# Patient Record
Sex: MALE | NOT HISPANIC OR LATINO
[De-identification: names, ages, dates, MRNs, and addresses within clinical notes are randomized per-mention and may not be internally consistent; named-entity substitution may affect disease eponyms.]

---

## 2017-07-23 RX ORDER — VALACYCLOVIR 500 MG/1
1 TABLET, FILM COATED ORAL
Qty: 0 | Refills: 0 | COMMUNITY
Start: 2017-07-23 | End: 2017-07-30

## 2017-07-24 ENCOUNTER — TRANSCRIPTION ENCOUNTER (OUTPATIENT)
Age: 42
End: 2017-07-24

## 2017-07-24 ENCOUNTER — INPATIENT (INPATIENT)
Facility: HOSPITAL | Age: 42
LOS: 1 days | Discharge: HOME | DRG: 103 | End: 2017-07-26
Attending: INTERNAL MEDICINE | Admitting: INTERNAL MEDICINE
Payer: COMMERCIAL

## 2017-07-24 VITALS
OXYGEN SATURATION: 100 % | WEIGHT: 184.97 LBS | TEMPERATURE: 98 F | RESPIRATION RATE: 18 BRPM | HEART RATE: 71 BPM | HEIGHT: 69 IN | DIASTOLIC BLOOD PRESSURE: 59 MMHG | SYSTOLIC BLOOD PRESSURE: 128 MMHG

## 2017-07-24 DIAGNOSIS — G43.909 MIGRAINE, UNSPECIFIED, NOT INTRACTABLE, WITHOUT STATUS MIGRAINOSUS: ICD-10-CM

## 2017-07-24 DIAGNOSIS — B02.9 ZOSTER WITHOUT COMPLICATIONS: ICD-10-CM

## 2017-07-24 DIAGNOSIS — R51 HEADACHE: ICD-10-CM

## 2017-07-24 DIAGNOSIS — G43.011 MIGRAINE WITHOUT AURA, INTRACTABLE, WITH STATUS MIGRAINOSUS: ICD-10-CM

## 2017-07-24 LAB
ALBUMIN SERPL ELPH-MCNC: 4.7 G/DL — SIGNIFICANT CHANGE UP (ref 3.3–5)
ALP SERPL-CCNC: 49 U/L — SIGNIFICANT CHANGE UP (ref 40–120)
ALT FLD-CCNC: 27 U/L RC — SIGNIFICANT CHANGE UP (ref 10–45)
ANION GAP SERPL CALC-SCNC: 16 MMOL/L — SIGNIFICANT CHANGE UP (ref 5–17)
AST SERPL-CCNC: 22 U/L — SIGNIFICANT CHANGE UP (ref 10–40)
BASOPHILS # BLD AUTO: 0 K/UL — SIGNIFICANT CHANGE UP (ref 0–0.2)
BASOPHILS NFR BLD AUTO: 0 % — SIGNIFICANT CHANGE UP (ref 0–2)
BILIRUB SERPL-MCNC: 0.3 MG/DL — SIGNIFICANT CHANGE UP (ref 0.2–1.2)
BUN SERPL-MCNC: 16 MG/DL — SIGNIFICANT CHANGE UP (ref 7–23)
CALCIUM SERPL-MCNC: 9.7 MG/DL — SIGNIFICANT CHANGE UP (ref 8.4–10.5)
CHLORIDE SERPL-SCNC: 100 MMOL/L — SIGNIFICANT CHANGE UP (ref 96–108)
CO2 SERPL-SCNC: 23 MMOL/L — SIGNIFICANT CHANGE UP (ref 22–31)
CREAT SERPL-MCNC: 0.91 MG/DL — SIGNIFICANT CHANGE UP (ref 0.5–1.3)
EOSINOPHIL # BLD AUTO: 0 K/UL — SIGNIFICANT CHANGE UP (ref 0–0.5)
EOSINOPHIL NFR BLD AUTO: 0.3 % — SIGNIFICANT CHANGE UP (ref 0–6)
GAS PNL BLDV: SIGNIFICANT CHANGE UP
GLUCOSE SERPL-MCNC: 111 MG/DL — HIGH (ref 70–99)
HCT VFR BLD CALC: 46 % — SIGNIFICANT CHANGE UP (ref 39–50)
HGB BLD-MCNC: 15.2 G/DL — SIGNIFICANT CHANGE UP (ref 13–17)
LYMPHOCYTES # BLD AUTO: 0.7 K/UL — LOW (ref 1–3.3)
LYMPHOCYTES # BLD AUTO: 10.1 % — LOW (ref 13–44)
MCHC RBC-ENTMCNC: 28.3 PG — SIGNIFICANT CHANGE UP (ref 27–34)
MCHC RBC-ENTMCNC: 33 GM/DL — SIGNIFICANT CHANGE UP (ref 32–36)
MCV RBC AUTO: 85.6 FL — SIGNIFICANT CHANGE UP (ref 80–100)
MONOCYTES # BLD AUTO: 0.4 K/UL — SIGNIFICANT CHANGE UP (ref 0–0.9)
MONOCYTES NFR BLD AUTO: 6.6 % — SIGNIFICANT CHANGE UP (ref 2–14)
NEUTROPHILS # BLD AUTO: 5.6 K/UL — SIGNIFICANT CHANGE UP (ref 1.8–7.4)
NEUTROPHILS NFR BLD AUTO: 83 % — HIGH (ref 43–77)
PLATELET # BLD AUTO: 175 K/UL — SIGNIFICANT CHANGE UP (ref 150–400)
POTASSIUM SERPL-MCNC: 4.2 MMOL/L — SIGNIFICANT CHANGE UP (ref 3.5–5.3)
POTASSIUM SERPL-SCNC: 4.2 MMOL/L — SIGNIFICANT CHANGE UP (ref 3.5–5.3)
PROT SERPL-MCNC: 8 G/DL — SIGNIFICANT CHANGE UP (ref 6–8.3)
RBC # BLD: 5.37 M/UL — SIGNIFICANT CHANGE UP (ref 4.2–5.8)
RBC # FLD: 12.9 % — SIGNIFICANT CHANGE UP (ref 10.3–14.5)
SODIUM SERPL-SCNC: 139 MMOL/L — SIGNIFICANT CHANGE UP (ref 135–145)
WBC # BLD: 6.7 K/UL — SIGNIFICANT CHANGE UP (ref 3.8–10.5)
WBC # FLD AUTO: 6.7 K/UL — SIGNIFICANT CHANGE UP (ref 3.8–10.5)

## 2017-07-24 PROCEDURE — 99285 EMERGENCY DEPT VISIT HI MDM: CPT

## 2017-07-24 PROCEDURE — 99223 1ST HOSP IP/OBS HIGH 75: CPT

## 2017-07-24 RX ORDER — SODIUM CHLORIDE 9 MG/ML
1000 INJECTION INTRAMUSCULAR; INTRAVENOUS; SUBCUTANEOUS ONCE
Qty: 0 | Refills: 0 | Status: COMPLETED | OUTPATIENT
Start: 2017-07-24 | End: 2017-07-24

## 2017-07-24 RX ORDER — ACETAMINOPHEN 500 MG
1000 TABLET ORAL ONCE
Qty: 0 | Refills: 0 | Status: COMPLETED | OUTPATIENT
Start: 2017-07-24 | End: 2017-07-24

## 2017-07-24 RX ORDER — METOCLOPRAMIDE HCL 10 MG
10 TABLET ORAL EVERY 8 HOURS
Qty: 0 | Refills: 0 | Status: DISCONTINUED | OUTPATIENT
Start: 2017-07-24 | End: 2017-07-25

## 2017-07-24 RX ORDER — SODIUM CHLORIDE 9 MG/ML
1000 INJECTION INTRAMUSCULAR; INTRAVENOUS; SUBCUTANEOUS
Qty: 0 | Refills: 0 | Status: DISCONTINUED | OUTPATIENT
Start: 2017-07-24 | End: 2017-07-26

## 2017-07-24 RX ORDER — ACETAMINOPHEN 500 MG
650 TABLET ORAL EVERY 6 HOURS
Qty: 0 | Refills: 0 | Status: DISCONTINUED | OUTPATIENT
Start: 2017-07-24 | End: 2017-07-26

## 2017-07-24 RX ORDER — PANTOPRAZOLE SODIUM 20 MG/1
40 TABLET, DELAYED RELEASE ORAL
Qty: 0 | Refills: 0 | Status: DISCONTINUED | OUTPATIENT
Start: 2017-07-24 | End: 2017-07-26

## 2017-07-24 RX ORDER — MORPHINE SULFATE 50 MG/1
2 CAPSULE, EXTENDED RELEASE ORAL EVERY 8 HOURS
Qty: 0 | Refills: 0 | Status: DISCONTINUED | OUTPATIENT
Start: 2017-07-24 | End: 2017-07-25

## 2017-07-24 RX ORDER — VALACYCLOVIR 500 MG/1
1000 TABLET, FILM COATED ORAL EVERY 8 HOURS
Qty: 0 | Refills: 0 | Status: DISCONTINUED | OUTPATIENT
Start: 2017-07-24 | End: 2017-07-25

## 2017-07-24 RX ORDER — ACYCLOVIR SODIUM 500 MG
0 VIAL (EA) INTRAVENOUS
Qty: 0 | Refills: 0 | COMMUNITY

## 2017-07-24 RX ORDER — MORPHINE SULFATE 50 MG/1
4 CAPSULE, EXTENDED RELEASE ORAL ONCE
Qty: 0 | Refills: 0 | Status: DISCONTINUED | OUTPATIENT
Start: 2017-07-24 | End: 2017-07-24

## 2017-07-24 RX ORDER — KETOROLAC TROMETHAMINE 30 MG/ML
30 SYRINGE (ML) INJECTION EVERY 8 HOURS
Qty: 0 | Refills: 0 | Status: DISCONTINUED | OUTPATIENT
Start: 2017-07-24 | End: 2017-07-26

## 2017-07-24 RX ORDER — METOCLOPRAMIDE HCL 10 MG
10 TABLET ORAL ONCE
Qty: 0 | Refills: 0 | Status: COMPLETED | OUTPATIENT
Start: 2017-07-24 | End: 2017-07-24

## 2017-07-24 RX ORDER — VALPROIC ACID (AS SODIUM SALT) 250 MG/5ML
500 SOLUTION, ORAL ORAL ONCE
Qty: 0 | Refills: 0 | Status: COMPLETED | OUTPATIENT
Start: 2017-07-24 | End: 2017-07-24

## 2017-07-24 RX ORDER — KETOROLAC TROMETHAMINE 30 MG/ML
10 SYRINGE (ML) INJECTION
Qty: 0 | Refills: 0 | Status: DISCONTINUED | OUTPATIENT
Start: 2017-07-24 | End: 2017-07-24

## 2017-07-24 RX ADMIN — MORPHINE SULFATE 4 MILLIGRAM(S): 50 CAPSULE, EXTENDED RELEASE ORAL at 15:59

## 2017-07-24 RX ADMIN — MORPHINE SULFATE 4 MILLIGRAM(S): 50 CAPSULE, EXTENDED RELEASE ORAL at 14:23

## 2017-07-24 RX ADMIN — Medication 10 MILLIGRAM(S): at 21:21

## 2017-07-24 RX ADMIN — SODIUM CHLORIDE 1000 MILLILITER(S): 9 INJECTION INTRAMUSCULAR; INTRAVENOUS; SUBCUTANEOUS at 14:07

## 2017-07-24 RX ADMIN — Medication 10 MILLIGRAM(S): at 20:21

## 2017-07-24 RX ADMIN — Medication 1000 MILLIGRAM(S): at 14:23

## 2017-07-24 RX ADMIN — Medication 220 MILLIGRAM(S): at 16:30

## 2017-07-24 RX ADMIN — VALACYCLOVIR 1000 MILLIGRAM(S): 500 TABLET, FILM COATED ORAL at 18:30

## 2017-07-24 RX ADMIN — Medication 10 MILLIGRAM(S): at 14:12

## 2017-07-24 RX ADMIN — Medication 650 MILLIGRAM(S): at 20:21

## 2017-07-24 RX ADMIN — SODIUM CHLORIDE 70 MILLILITER(S): 9 INJECTION INTRAMUSCULAR; INTRAVENOUS; SUBCUTANEOUS at 19:59

## 2017-07-24 RX ADMIN — MORPHINE SULFATE 2 MILLIGRAM(S): 50 CAPSULE, EXTENDED RELEASE ORAL at 23:00

## 2017-07-24 RX ADMIN — MORPHINE SULFATE 2 MILLIGRAM(S): 50 CAPSULE, EXTENDED RELEASE ORAL at 22:40

## 2017-07-24 RX ADMIN — Medication 400 MILLIGRAM(S): at 14:07

## 2017-07-24 RX ADMIN — Medication 10 MILLIGRAM(S): at 22:39

## 2017-07-24 NOTE — H&P ADULT - NEGATIVE GASTROINTESTINAL SYMPTOMS
no change in bowel habits/no vomiting/no flatulence/no diarrhea/no nausea/no constipation no flatulence/no vomiting/no constipation/no diarrhea/no change in bowel habits

## 2017-07-24 NOTE — H&P ADULT - PROBLEM SELECTOR PLAN 1
Patient with h/o Migraine usually relieved by Sumatriptan   Will start Toradol/ SUmatriptan   Patient might benefit from a migraine prevention agent upon D/C

## 2017-07-24 NOTE — CONSULT NOTE ADULT - ASSESSMENT
43 y/o M w/ a PMHx of migraines who presents to the ED with headache. Pt recently diagnosed to have Shingles on Sunday at urgent and has been taking Valtrex. Exam is consistent with Lower back vesicular rash of herpes zoster in R lower back. No hx of immuno deficiency. Zooster rash with severe headache raises the possibility of aseptic meningitis. 41 y/o M w/ a PMHx of migraines who presents to the ED with headache. Pt recently diagnosed to have Shingles on Sunday at urgent and has been taking Valtrex. Exam is consistent with Lower back vesicular rash of herpes zoster in R lower back. No hx of immuno deficiency. Zooster rash with severe headache raises the possibility of aseptic meningitis. 41 y/o M w/ a PMHx of migraines who presents to the ED with headache. Pt recently diagnosed to have Shingles on Sunday at urgent and has been taking Valtrex. Exam is consistent with  vesicular rash of herpes zoster in R lower back. No hx of immuno deficiency. Zooster rash with severe headache raises the possibility of aseptic meningitis. 43 y/o M w/ a PMHx of migraines who presents to the ED with headache. Pt recently diagnosed to have Shingles on Sunday at urgent and has been taking Valtrex. Exam is consistent with vesicular rash of herpes zoster in R lower back. No hx of immuno deficiency. Zooster rash with severe headache raises the possibility of aseptic meningitis. 41 y/o M w/ a PMHx of migraines who presents to the ED with headache. Pt recently diagnosed to have Shingles on Sunday at urgent and has been taking Valtrex. Exam is consistent with vesicular rash of herpes zoster in R lower back. No hx of immuno deficiency.

## 2017-07-24 NOTE — ED PROVIDER NOTE - ATTENDING CONTRIBUTION TO CARE
****ATTENDING**** 41yo male hx Migraine presents with HA x 1 day. Patient diagnosed with R flank shingles 2 days ago on valtrex. States HA is similar to prior Migraine. Denies any fever. + chills. No recent travel. + photophobia similar to prior migraine. On exam, Patient is awake,alert,oriented x 3. PERRL, EOMI. Patient's chest is clear to ausculation, +s1s2. Abdomen is soft nd/nt +BS. Back L4 R flank dermatome rash with vesicular lesions, no overlying cellulitis. Extremity with no swelling or calf tenderness. Neuro intact.  Check Labs, DDx Migraine, low susp for meningitis. Neuro and ID consult.  Patient states he took triptan before arrival wo any improvement.

## 2017-07-24 NOTE — H&P ADULT - NEGATIVE CARDIOVASCULAR SYMPTOMS
no orthopnea/no dyspnea on exertion/no chest pain/no peripheral edema/no paroxysmal nocturnal dyspnea/no palpitations

## 2017-07-24 NOTE — CONSULT NOTE ADULT - SUBJECTIVE AND OBJECTIVE BOX
Patient is a 42y old  Male who presents with a chief complaint of     HPI:  Patient is a 41 y/o M w/ a PMHx of migraines who presents to the ED with headache. Patient reports that he began having pain and a rash on his right lower back 2 days ago and was subsequently found to have Shingles at Urgent Care the following day. Patient has been taking Valtrex for shingles since yesterday. This AM, pt reports that he develops a severe HA this morning associated with persistent nausea. HA is described as circumferential and "tightening." It is associated with mild photophobia. No aura mentioned. Patient reports that it feels very similar to prior migraine episodes. He states that his last episode of a migraine was about3-4 years ago. He states that he took 1 PO Sumatriptan, 1 injectable Sumatriptan, and PO Reglan this AM which helped the HA somewhat, but not the nausea. He endorses some chills, but denies any fever, chest pain, shortness of breath, or abdominal pain.    PAST MEDICAL & SURGICAL HISTORY:  Migraine  Shingles      Social history:  Non smoker  No alcohol  No drug abuse  Sexually active with wife  No other sexual partners  Works as   Had Chicken pox as child      FAMILY HISTORY:  No pertinent     Allergies    No Known Allergies    Intolerances        Antimicrobials:  None        Vital Signs Last 24 Hrs  T(C): 36.5 (24 Jul 2017 12:28), Max: 36.5 (24 Jul 2017 12:28)  T(F): 97.7 (24 Jul 2017 12:28), Max: 97.7 (24 Jul 2017 12:28)  HR: 74 (24 Jul 2017 12:31) (71 - 74)  BP: 146/87 (24 Jul 2017 12:31) (128/59 - 146/87)  BP(mean): --  RR: 16 (24 Jul 2017 12:31) (16 - 18)  SpO2: 100% (24 Jul 2017 12:31) (100% - 100%)                              15.2   6.7   )-----------( 175      ( 24 Jul 2017 14:18 )             46.0         07-24    139  |  100  |  16  ----------------------------<  111<H>  4.2   |  23  |  0.91    Ca    9.7      24 Jul 2017 14:18    TPro  8.0  /  Alb  4.7  /  TBili  0.3  /  DBili  x   /  AST  22  /  ALT  27  /  AlkPhos  49  07-24      RECENT CULTURES: Patient is a 42y old  Male who presents with a chief complaint of     HPI:  Patient is a 43 y/o M w/ a PMHx of migraines who presents to the ED with headache. Patient reports that he began having pain and a rash on his right lower back 2 days ago and was subsequently found to have Shingles at Urgent Care the following day. Patient has been taking Valtrex for shingles since yesterday. This AM, pt reports that he develops a severe HA this morning associated with persistent nausea. HA is described as circumferential and "tightening." It is associated with mild photophobia. No aura mentioned. Patient reports that it feels very similar to prior migraine episodes. He states that his last episode of a migraine was about3-4 years ago. He states that he took 1 PO Sumatriptan, 1 injectable Sumatriptan, and PO Reglan this AM which helped the HA somewhat, but not the nausea. He endorses some chills, but denies any fever, chest pain, shortness of breath, or abdominal pain.    PAST MEDICAL & SURGICAL HISTORY:  Migraine  Shingles      SOCIAL HISTORY  Non smoker  No alcohol  No drug abuse  Sexually active with wife  No other sexual partners  Works as   Had chicken pox as child      FAMILY HISTORY:  No pertinent     Allergies    No Known Allergies    Intolerances        Antimicrobials:  None        Vital Signs Last 24 Hrs  T(C): 36.5 (24 Jul 2017 12:28), Max: 36.5 (24 Jul 2017 12:28)  T(F): 97.7 (24 Jul 2017 12:28), Max: 97.7 (24 Jul 2017 12:28)  HR: 74 (24 Jul 2017 12:31) (71 - 74)  BP: 146/87 (24 Jul 2017 12:31) (128/59 - 146/87)  BP(mean): --  RR: 16 (24 Jul 2017 12:31) (16 - 18)  SpO2: 100% (24 Jul 2017 12:31) (100% - 100%)                              15.2   6.7   )-----------( 175      ( 24 Jul 2017 14:18 )             46.0         07-24    139  |  100  |  16  ----------------------------<  111<H>  4.2   |  23  |  0.91    Ca    9.7      24 Jul 2017 14:18    TPro  8.0  /  Alb  4.7  /  TBili  0.3  /  DBili  x   /  AST  22  /  ALT  27  /  AlkPhos  49  07-24      RECENT CULTURES: Patient is a 42y old  Male who presents with a chief complaint of     HPI:  Patient is a 41 y/o M w/ a PMHx of migraines who presents to the ED with headache. Patient reports that he began having pain and a rash on his right lower back 2 days ago and was subsequently found to have Shingles at Urgent Care the following day. Patient has been taking Valtrex for shingles since yesterday. This AM, pt reports that he develops a severe HA this morning associated with persistent nausea. HA is described as circumferential and "tightening." It is associated with mild photophobia. No aura mentioned. Patient reports that it feels very similar to prior migraine episodes. He states that his last episode of a migraine was about3-4 years ago. He states that he took 1 PO Sumatriptan, 1 injectable Sumatriptan, and PO Reglan this AM which helped the HA somewhat, but not the nausea. He endorses some chills, but denies any fever, chest pain, shortness of breath, or abdominal pain. No sick contact or recent travel.     PAST MEDICAL & SURGICAL HISTORY:  Migraine      SOCIAL HISTORY  Non smoker  No alcohol  No drug abuse  Sexually active with wife  No other sexual partners  Works as   Had chicken pox as child      FAMILY HISTORY:  No pertinent history in first degree relatives    Allergies    No Known Allergies    Intolerances        Antimicrobials:  None        Vital Signs Last 24 Hrs  T(C): 36.5 (24 Jul 2017 12:28), Max: 36.5 (24 Jul 2017 12:28)  T(F): 97.7 (24 Jul 2017 12:28), Max: 97.7 (24 Jul 2017 12:28)  HR: 74 (24 Jul 2017 12:31) (71 - 74)  BP: 146/87 (24 Jul 2017 12:31) (128/59 - 146/87)  BP(mean): --  RR: 16 (24 Jul 2017 12:31) (16 - 18)  SpO2: 100% (24 Jul 2017 12:31) (100% - 100%)      LABORATORY                        15.2   6.7   )-----------( 175      ( 24 Jul 2017 14:18 )             46.0         07-24    139  |  100  |  16  ----------------------------<  111<H>  4.2   |  23  |  0.91    Ca    9.7      24 Jul 2017 14:18    TPro  8.0  /  Alb  4.7  /  TBili  0.3  /  DBili  x   /  AST  22  /  ALT  27  /  AlkPhos  49  07-24      RECENT CULTURES:

## 2017-07-24 NOTE — H&P ADULT - NSHPSOCIALHISTORY_GEN_ALL_CORE
Patient is  Patient is , works a lot and in a stressful environment , denies any smoking , occasional ETOH use.

## 2017-07-24 NOTE — H&P ADULT - NSHPLABSRESULTS_GEN_ALL_CORE
Labs are reviewed by with :  WBC = 6.7  H/H= 15.2/46.0    Platelet = 175      Eos= 0.3   N= 83   Lymph= 10.1   Cr= 0.91  Lactate=2.0

## 2017-07-24 NOTE — H&P ADULT - PROBLEM SELECTOR PLAN 3
Can be Migraine Attack VS Viral VS side effects from the Valtrex   continue close monitoring of  patient for any change in mentation, fever , any neurologic complains , neck pain or rigidity .    Patient Can be Migraine Attack VS Viral VS side effects from the Valtrex   continue close monitoring of  patient for any change in mentation, fever , any neurologic complains , neck pain or rigidity .    Patient has refused has refused LP as per ID and patient confirms his refusal for LP and wants to be monitored Can be Migraine Attack VS Viral VS side effects from the Valtrex   continue close monitoring of  patient for any change in mentation, fever , any neurologic complains , neck pain or rigidity .    Patient has refused has refused LP as per ID and patient confirms his refusal for LP and wants to be monitored  close monitoring.  I spent more than 30 minutes with the family and including the mother who states that patient's father is a Neurologist and patient's wife.    Case is discussed with the covering NP

## 2017-07-24 NOTE — ED PROVIDER NOTE - MEDICAL DECISION MAKING DETAILS
43 y/o M w/ a PMHx of migraines who presents to the ED with a headache that feels similar to prior migraine episodes, recently found to have shingles of R lower back. Pt not exhibiting confusion 43 y/o M w/ a PMHx of migraines who presents to the ED with a headache that feels similar to prior migraine episodes, recently found to have shingles of R lower back. Pt afebrile, A+Ox3, no neck stiffness. However, given Shingles Hx, will check for infectious etiology. Will provide symptom control and reassess.

## 2017-07-24 NOTE — CONSULT NOTE ADULT - PROBLEM SELECTOR RECOMMENDATION 9
-Check for HIV -Check for HIV  -Clinically very low suspicion for meningitis however cant r/o without LP  -Pt refusing LP at this time  -Resume PO valtrex 1g q8  -Incase of worsening headache or spreading rash( >2 dermatome) can start IV acyclovir 10mg/kg q8 -Check for HIV  -Clinically very low suspicion for meningitis however cant r/o without LP  -Pt refusing LP at this time  -Resume PO valtrex 1g q8  -Incase of worsening headache or spreading rash( >2 dermatome) can start IV Acyclovir 10mg/kg q8--with adequate hydration -Check for HIV  -Clinically very low suspicion for meningitis.Most likely HA due to migraine.Patient already says is better, however cannot definitely  r/o without LP  -Pt refusing LP at this time  -Resume PO valtrex 1g q8  -Incase of worsening headache or spreading rash( >2 dermatome) can start IV Acyclovir 10mg/kg q8--with adequate hydration-would recommend LP (neuroradiology guided) in that case.

## 2017-07-24 NOTE — ED ADULT NURSE NOTE - OBJECTIVE STATEMENT
42 year old male presents a+ox3,presents to ED complaining of having a migraine. pt presents with photosensitivity and circumferential 42 year old male presents a+ox3,presents to ED complaining of having a migraine. Pt presents with photosensitivity and circumferential head tightness/ pain 10/10 pain accompanied with nausea.  PERRL 4mm bilaterally, EOM intact. Neurologically grossly intact. No nuchal rigidity noted. Denies fevers, reports chills /bodyaches. Reports being dx with shingles yesterday and started on valacyclovir yesterday.  States lesions to his back started 2 days ago. +Open lesions to right side of lower back and 3 reddened areas to right side of lower abdomen. Pt states that his father is a neurologist and received Imitrex and Reglan 10mg PO at 11am today.  Pt states that the Reglan has not helped. Denies recent head trauma/injury/fall. ski w/d.

## 2017-07-24 NOTE — ED PROVIDER NOTE - PHYSICAL EXAMINATION
PHYSICAL EXAM:  Constitutional: Moderate distress 2/2 pain/nausea  Eyes: EOMI, PERRLA  ENMT: No nasal discharge or deformity  Neck: Supple  Back:   Respiratory:  Cardiovascular:  Gastrointestinal:  Genitourinary:  Rectal:  Extremities:  Vascular:  Neurological:  Skin:  Lymph Nodes:  Musculoskeletal:  Psychiatric: PHYSICAL EXAM:  Constitutional: Moderate distress 2/2 pain/nausea  Eyes: EOMI, PERRLA  ENMT: No nasal discharge or deformity  Neck: Supple  Respiratory: CTAB; No wheeze  Cardiovascular: RRR; + S1/S2  Gastrointestinal: + BS, Soft, NT  Extremities: No peripheral edema  Neurological: A+Ox3, Nonfocal  Skin: Vesicular rash on R lower back that wraps around toward R lower abdomen  Psychiatric: Normal mood and affect

## 2017-07-24 NOTE — CONSULT NOTE ADULT - ATTENDING COMMENTS
Pt seen 7/25/17 9AM with team.  Pt being transported to procedure, visit abbreviated.  Team discussed case with pt, sig other, and father.  Pt appears to have status migrainosus.  Has occurred to lesser extent in past.  Has taken sumatriptan, now reglan, toradol, and some depakote with minimal relief.  Will attempt additional dose of VPA, toradol when due, and reglan, then DHE protocol.    ID eval for shingles, LP scheduled to r/o infectious process, ie encephalitis, though at this time, no nuchal rigidity/AMS, non focal exam.

## 2017-07-24 NOTE — ED PROVIDER NOTE - PROGRESS NOTE DETAILS
Patient reporting continued pain/nausea after being given medications. ID consulted to r/o meningitis. Neurology consulted as

## 2017-07-24 NOTE — H&P ADULT - HISTORY OF PRESENT ILLNESS
Patient is a 43 y/o M w/ a PMHx of migraines who presents to the ED with a headache. Patient reports that he began having pain and a rash on his right lower back 2 days ago and was subsequently found to have Shingles at Urgent Care the following day. Patient has been taking Valtrex for shingles since yesterday. This AM, pt reports that he develops a severe HA this morning associated with persistent nausea. HA is described as circumferential and "tightening." It is associated with photophobia and some phonophobia. No aura mentioned. Patient reports that it feels very similar to prior migraine episodes. He states that his last episode of a migraine was about 5 years ago. He states that he took 1 PO Sumatriptan, 1 injectable Sumatriptan, and PO Reglan this AM which helped the HA somewhat, but not the nausea. He endorses some chills, but denies any fever, chest pain, shortness of breath, or abdominal pain.    ED   :  T= 98F  HR = 71-84    BP= 118-146/59-87    Patient received Tylenol IV, MOrphine 4 mg IV , Valproate NA 500mg IV X one , and reglan .  Valtrex oral was started .  Patient was evaluated by Neurologist and ID physician. Patient is a 43 y/o M w/ a PMHx of migraines who presents to the ED with a generalized headache. Patient reports that he began having pain and a rash on his right lower back 2 days ago and was subsequently found to have Shingles at Urgent Care the following day. Patient has been taking Valtrex for shingles since yesterday. This AM, pt reports that he develops a severe HA which was circumferential rated as 9/10 associated with nausea, no vomiting and photophobia.  NO neck pain , no change in mentation, no fever , but had chills.  No kown sick contact.  NO general or localized weakness. Patient states to be under a lot of stress at work.  Patient admits to h/o migraine headache and feels that this headache episode is similar to prior migraine headache attacks.  Last migraine attack was >3 years ago.   .  He states that he took 1 PO Sumatriptan, 1 injectable Sumatriptan, and PO Reglan this AM which helped the HA somewhat, but not the nausea.  Then he reports to the ER .      ED   :  T= 98F  HR = 71-84    BP= 118-146/59-87    Patient received Tylenol IV, MOrphine 4 mg IV , Valproate NA 500mg IV X one , and Reglan .  Valtrex oral was started .  Patient was evaluated by Neurologist  who believes that the headache is most consistent to migraine, and also evaluated by  ID physician who recommend continued observation and monitor for any change or worsening of his symptoms.  Patient states to feel better with improved headache.

## 2017-07-24 NOTE — CONSULT NOTE ADULT - ATTENDING COMMENTS
Patient seen and examined  Immunocompetent patient with single/2 dermatome continuous zoster  Headache-has h/o migraines in past.  No confusion,HMF normal  No fever  No meningeal signs  Otherwise non toxic appearance.  Most likely HA due to migraine v reactive but can definitely rule out VZV meningitis with LP,patient and family refusing .  Would rec:  1) Observe for any worsening,progression/spread of lesions  2) Oral valtrex for now  3) Pain meds per primary,neuro  4) If any worsening or progression of HA,neuro symptoms would recommend reconsider LP(?Neuroradiology) and IV acyclovir  5) Also IV if progression of rash/dissemination  tailor plan per course,results.  Case d/w ED team,family  other details as above  ID will follow  [id number] Patient seen and examined  Immunocompetent patient with single/2 dermatome continuous zoster  Headache-has h/o migraines in past.  No confusion, HMF normal  No fever  No meningeal signs  Otherwise non toxic appearance.  Most likely HA due to migraine v reactive but can definitely rule out VZV meningitis with LP,patient and family refusing .  Would rec:  1) Observe for any worsening,progression/spread of lesions  2) Oral valtrex for now  3) Pain meds per primary,neuro  4) If any worsening or progression of HA,neuro symptoms would recommend reconsider LP(?Neuroradiology) and IV acyclovir  5) Also IV if progression of rash/dissemination  tailor plan per course,results.  Case d/w ED team,family  other details as above  ID will follow  [id number]

## 2017-07-24 NOTE — CONSULT NOTE ADULT - PROBLEM SELECTOR RECOMMENDATION 9
- S/p IV reglan 10 mg, 1 L IVF, IV Tylenol, 4 mg morphine  - Recommend IV valproate 500 mg x 1, infuse over 15 minutes  - Toradol 30 mg x 1 when able if valproate not effective  - Continue with supportive care - s/p IV reglan 10 mg, 1 L IVF, IV Tylenol, 4 mg morphine  - Recommend IV valproate 500 mg x 1, infuse over 15 minutes  - Toradol 30 mg x 1 when able if valproate not effective  - Continue with supportive care

## 2017-07-24 NOTE — ED PROVIDER NOTE - NS ED ROS FT
REVIEW OF SYSTEMS  General: + Chills, No fever  Skin/Breast: + R lower back rash, No itching  Ophthalmologic: No vision changes  ENMT: No nasal congestion or sore throat  Respiratory and Thorax: No shortness of breath or cough  Cardiovascular: No chest pain or palpitations  Gastrointestinal: + Nausea, No vomiting or diarrhea  Musculoskeletal: + Low back pain, No joint swelling  Neurological: + Headache, No confusion

## 2017-07-24 NOTE — ED ADULT NURSE REASSESSMENT NOTE - NS ED NURSE REASSESS COMMENT FT1
neurology at bedside.  awaiting for depacon to come from pharmacy .  spoke with pharmacy upon MD placing order in [facility]. awaiting medications and will follow up

## 2017-07-24 NOTE — CONSULT NOTE ADULT - ASSESSMENT
43 y/o man w/ PMH migraine p/w headache since this AM similar in quality to his previous migraines, however more severe and difficult to treat. No meningeal signs on exam, neurologic exam is non-focal. Symptoms likely 2/2 migraine exacerbation. 43 y/o man w/ PMH migraine p/w headache since this AM similar in quality to his previous migraines, acc with n/v,  however more severe and difficult to treat. No meningeal signs on exam, neurologic exam is non-focal. Symptoms likely 2/2 migraine exacerbation.

## 2017-07-24 NOTE — ED PROVIDER NOTE - OBJECTIVE STATEMENT
Patient is a 41 y/o M w/ a PMHx of migraines who presents to the ED with a headache. Patient reports that he began having pain and a rash on his right lower back 2 days ago and was subsequently found to have Shingles at Urgent Care the following day. Patient has been taking Valtrex for shingles since yesterday. This AM, pt reports that he develops a severe HA this morning associated with persistent nausea. HA is described as circumferential and "tightening." It is associated with photophobia and some phonophobia. No aura mentioned. Patient reports that it feels very similar to prior migraine episodes. He states that his last episode of a migraine was about 5 years ago. He states that he took 1 PO Sumatriptan, 1 injectable Sumatriptan, and PO Reglan this AM which helped the HA somewhat, but not the nausea. He endorses some chills, but denies any fever, chest pain, shortness of breath, or abdominal pain.

## 2017-07-24 NOTE — CONSULT NOTE ADULT - SKIN COMMENTS
R lower back L4/L5 area with vesicular rash, crossed the midline R lower back L3/L4 area with vesicular rash, crossed the midline R lower back L3/L4 area with vesicular rash, few lesions near midline

## 2017-07-25 LAB
ALBUMIN SERPL ELPH-MCNC: 3.6 G/DL — SIGNIFICANT CHANGE UP (ref 3.3–5)
ALP SERPL-CCNC: 39 U/L — LOW (ref 40–120)
ALT FLD-CCNC: 21 U/L — SIGNIFICANT CHANGE UP (ref 10–45)
ANION GAP SERPL CALC-SCNC: 13 MMOL/L — SIGNIFICANT CHANGE UP (ref 5–17)
APTT BLD: 27.2 SEC — LOW (ref 27.5–37.4)
AST SERPL-CCNC: 16 U/L — SIGNIFICANT CHANGE UP (ref 10–40)
BASOPHILS # BLD AUTO: 0.02 K/UL — SIGNIFICANT CHANGE UP (ref 0–0.2)
BASOPHILS NFR BLD AUTO: 0.3 % — SIGNIFICANT CHANGE UP (ref 0–2)
BILIRUB SERPL-MCNC: 0.2 MG/DL — SIGNIFICANT CHANGE UP (ref 0.2–1.2)
BUN SERPL-MCNC: 12 MG/DL — SIGNIFICANT CHANGE UP (ref 7–23)
CALCIUM SERPL-MCNC: 8.8 MG/DL — SIGNIFICANT CHANGE UP (ref 8.4–10.5)
CHLORIDE SERPL-SCNC: 105 MMOL/L — SIGNIFICANT CHANGE UP (ref 96–108)
CO2 SERPL-SCNC: 23 MMOL/L — SIGNIFICANT CHANGE UP (ref 22–31)
CREAT SERPL-MCNC: 0.89 MG/DL — SIGNIFICANT CHANGE UP (ref 0.5–1.3)
EOSINOPHIL # BLD AUTO: 0.02 K/UL — SIGNIFICANT CHANGE UP (ref 0–0.5)
EOSINOPHIL NFR BLD AUTO: 0.3 % — SIGNIFICANT CHANGE UP (ref 0–6)
GLUCOSE SERPL-MCNC: 96 MG/DL — SIGNIFICANT CHANGE UP (ref 70–99)
HCT VFR BLD CALC: 39.8 % — SIGNIFICANT CHANGE UP (ref 39–50)
HGB BLD-MCNC: 12.8 G/DL — LOW (ref 13–17)
HIV 1+2 AB+HIV1 P24 AG SERPL QL IA: SIGNIFICANT CHANGE UP
IMM GRANULOCYTES NFR BLD AUTO: 0.2 % — SIGNIFICANT CHANGE UP (ref 0–1.5)
INR BLD: 1.14 RATIO — SIGNIFICANT CHANGE UP (ref 0.88–1.16)
LYMPHOCYTES # BLD AUTO: 0.99 K/UL — LOW (ref 1–3.3)
LYMPHOCYTES # BLD AUTO: 17.3 % — SIGNIFICANT CHANGE UP (ref 13–44)
MCHC RBC-ENTMCNC: 26.7 PG — LOW (ref 27–34)
MCHC RBC-ENTMCNC: 32.2 GM/DL — SIGNIFICANT CHANGE UP (ref 32–36)
MCV RBC AUTO: 83.1 FL — SIGNIFICANT CHANGE UP (ref 80–100)
MONOCYTES # BLD AUTO: 0.54 K/UL — SIGNIFICANT CHANGE UP (ref 0–0.9)
MONOCYTES NFR BLD AUTO: 9.4 % — SIGNIFICANT CHANGE UP (ref 2–14)
NEUTROPHILS # BLD AUTO: 4.15 K/UL — SIGNIFICANT CHANGE UP (ref 1.8–7.4)
NEUTROPHILS NFR BLD AUTO: 72.5 % — SIGNIFICANT CHANGE UP (ref 43–77)
PLATELET # BLD AUTO: 165 K/UL — SIGNIFICANT CHANGE UP (ref 150–400)
POTASSIUM SERPL-MCNC: 4.2 MMOL/L — SIGNIFICANT CHANGE UP (ref 3.5–5.3)
POTASSIUM SERPL-SCNC: 4.2 MMOL/L — SIGNIFICANT CHANGE UP (ref 3.5–5.3)
PROT SERPL-MCNC: 6.3 G/DL — SIGNIFICANT CHANGE UP (ref 6–8.3)
PROTHROM AB SERPL-ACNC: 12.4 SEC — SIGNIFICANT CHANGE UP (ref 9.8–12.7)
RBC # BLD: 4.79 M/UL — SIGNIFICANT CHANGE UP (ref 4.2–5.8)
RBC # FLD: 14.6 % — HIGH (ref 10.3–14.5)
SODIUM SERPL-SCNC: 141 MMOL/L — SIGNIFICANT CHANGE UP (ref 135–145)
WBC # BLD: 5.73 K/UL — SIGNIFICANT CHANGE UP (ref 3.8–10.5)
WBC # FLD AUTO: 5.73 K/UL — SIGNIFICANT CHANGE UP (ref 3.8–10.5)

## 2017-07-25 PROCEDURE — 99223 1ST HOSP IP/OBS HIGH 75: CPT

## 2017-07-25 PROCEDURE — 70450 CT HEAD/BRAIN W/O DYE: CPT | Mod: 26

## 2017-07-25 RX ORDER — SUMATRIPTAN SUCCINATE 4 MG/.5ML
100 INJECTION, SOLUTION SUBCUTANEOUS DAILY
Qty: 0 | Refills: 0 | Status: DISCONTINUED | OUTPATIENT
Start: 2017-07-25 | End: 2017-07-25

## 2017-07-25 RX ORDER — HYDROMORPHONE HYDROCHLORIDE 2 MG/ML
1 INJECTION INTRAMUSCULAR; INTRAVENOUS; SUBCUTANEOUS ONCE
Qty: 0 | Refills: 0 | Status: DISCONTINUED | OUTPATIENT
Start: 2017-07-25 | End: 2017-07-25

## 2017-07-25 RX ORDER — DIHYDROERGOTAMINE MESYLATE 1 MG/ML
INJECTION INTRAMUSCULAR; INTRAVENOUS; SUBCUTANEOUS
Qty: 0 | Refills: 0 | Status: DISCONTINUED | OUTPATIENT
Start: 2017-07-25 | End: 2017-07-26

## 2017-07-25 RX ORDER — ACYCLOVIR SODIUM 500 MG
850 VIAL (EA) INTRAVENOUS ONCE
Qty: 0 | Refills: 0 | Status: COMPLETED | OUTPATIENT
Start: 2017-07-25 | End: 2017-07-25

## 2017-07-25 RX ORDER — DIHYDROERGOTAMINE MESYLATE 1 MG/ML
0.5 INJECTION INTRAMUSCULAR; INTRAVENOUS; SUBCUTANEOUS ONCE
Qty: 0 | Refills: 0 | Status: COMPLETED | OUTPATIENT
Start: 2017-07-25 | End: 2017-07-25

## 2017-07-25 RX ORDER — ACYCLOVIR SODIUM 500 MG
VIAL (EA) INTRAVENOUS
Qty: 0 | Refills: 0 | Status: DISCONTINUED | OUTPATIENT
Start: 2017-07-25 | End: 2017-07-26

## 2017-07-25 RX ORDER — ONDANSETRON 8 MG/1
4 TABLET, FILM COATED ORAL ONCE
Qty: 0 | Refills: 0 | Status: COMPLETED | OUTPATIENT
Start: 2017-07-25 | End: 2017-07-25

## 2017-07-25 RX ORDER — METOCLOPRAMIDE HCL 10 MG
10 TABLET ORAL EVERY 8 HOURS
Qty: 0 | Refills: 0 | Status: DISCONTINUED | OUTPATIENT
Start: 2017-07-25 | End: 2017-07-26

## 2017-07-25 RX ORDER — ACYCLOVIR SODIUM 500 MG
850 VIAL (EA) INTRAVENOUS EVERY 8 HOURS
Qty: 0 | Refills: 0 | Status: DISCONTINUED | OUTPATIENT
Start: 2017-07-25 | End: 2017-07-26

## 2017-07-25 RX ORDER — VALPROIC ACID (AS SODIUM SALT) 250 MG/5ML
850 SOLUTION, ORAL ORAL ONCE
Qty: 0 | Refills: 0 | Status: COMPLETED | OUTPATIENT
Start: 2017-07-25 | End: 2017-07-25

## 2017-07-25 RX ORDER — DIHYDROERGOTAMINE MESYLATE 1 MG/ML
1 INJECTION INTRAMUSCULAR; INTRAVENOUS; SUBCUTANEOUS EVERY 8 HOURS
Qty: 0 | Refills: 0 | Status: DISCONTINUED | OUTPATIENT
Start: 2017-07-25 | End: 2017-07-26

## 2017-07-25 RX ORDER — DIPHENHYDRAMINE HCL 50 MG
25 CAPSULE ORAL EVERY 8 HOURS
Qty: 0 | Refills: 0 | Status: DISCONTINUED | OUTPATIENT
Start: 2017-07-25 | End: 2017-07-26

## 2017-07-25 RX ADMIN — Medication 30 MILLIGRAM(S): at 18:35

## 2017-07-25 RX ADMIN — Medication 30 MILLIGRAM(S): at 01:46

## 2017-07-25 RX ADMIN — MORPHINE SULFATE 2 MILLIGRAM(S): 50 CAPSULE, EXTENDED RELEASE ORAL at 08:02

## 2017-07-25 RX ADMIN — VALACYCLOVIR 1000 MILLIGRAM(S): 500 TABLET, FILM COATED ORAL at 06:26

## 2017-07-25 RX ADMIN — DIHYDROERGOTAMINE MESYLATE 1 MILLIGRAM(S): 1 INJECTION INTRAMUSCULAR; INTRAVENOUS; SUBCUTANEOUS at 19:00

## 2017-07-25 RX ADMIN — PANTOPRAZOLE SODIUM 40 MILLIGRAM(S): 20 TABLET, DELAYED RELEASE ORAL at 06:26

## 2017-07-25 RX ADMIN — ONDANSETRON 4 MILLIGRAM(S): 8 TABLET, FILM COATED ORAL at 08:57

## 2017-07-25 RX ADMIN — Medication 25 MILLIGRAM(S): at 13:43

## 2017-07-25 RX ADMIN — ONDANSETRON 4 MILLIGRAM(S): 8 TABLET, FILM COATED ORAL at 19:58

## 2017-07-25 RX ADMIN — Medication 30 MILLIGRAM(S): at 10:18

## 2017-07-25 RX ADMIN — DIHYDROERGOTAMINE MESYLATE 1 MILLIGRAM(S): 1 INJECTION INTRAMUSCULAR; INTRAVENOUS; SUBCUTANEOUS at 18:34

## 2017-07-25 RX ADMIN — DIHYDROERGOTAMINE MESYLATE 0.5 MILLIGRAM(S): 1 INJECTION INTRAMUSCULAR; INTRAVENOUS; SUBCUTANEOUS at 11:00

## 2017-07-25 RX ADMIN — Medication 267 MILLIGRAM(S): at 19:58

## 2017-07-25 RX ADMIN — SUMATRIPTAN SUCCINATE 100 MILLIGRAM(S): 4 INJECTION, SOLUTION SUBCUTANEOUS at 06:24

## 2017-07-25 RX ADMIN — ONDANSETRON 4 MILLIGRAM(S): 8 TABLET, FILM COATED ORAL at 02:39

## 2017-07-25 RX ADMIN — HYDROMORPHONE HYDROCHLORIDE 1 MILLIGRAM(S): 2 INJECTION INTRAMUSCULAR; INTRAVENOUS; SUBCUTANEOUS at 02:24

## 2017-07-25 RX ADMIN — Medication 200 MILLIGRAM(S): at 10:32

## 2017-07-25 RX ADMIN — DIHYDROERGOTAMINE MESYLATE 0.5 MILLIGRAM(S): 1 INJECTION INTRAMUSCULAR; INTRAVENOUS; SUBCUTANEOUS at 10:32

## 2017-07-25 RX ADMIN — Medication 30 MILLIGRAM(S): at 18:00

## 2017-07-25 RX ADMIN — HYDROMORPHONE HYDROCHLORIDE 1 MILLIGRAM(S): 2 INJECTION INTRAMUSCULAR; INTRAVENOUS; SUBCUTANEOUS at 02:45

## 2017-07-25 RX ADMIN — Medication 10 MILLIGRAM(S): at 13:47

## 2017-07-25 RX ADMIN — Medication 267 MILLIGRAM(S): at 12:18

## 2017-07-25 RX ADMIN — Medication 10 MILLIGRAM(S): at 08:03

## 2017-07-25 RX ADMIN — Medication 30 MILLIGRAM(S): at 02:01

## 2017-07-25 RX ADMIN — Medication 30 MILLIGRAM(S): at 09:59

## 2017-07-25 RX ADMIN — MORPHINE SULFATE 2 MILLIGRAM(S): 50 CAPSULE, EXTENDED RELEASE ORAL at 08:20

## 2017-07-25 RX ADMIN — SUMATRIPTAN SUCCINATE 100 MILLIGRAM(S): 4 INJECTION, SOLUTION SUBCUTANEOUS at 07:20

## 2017-07-25 NOTE — PROGRESS NOTE ADULT - SUBJECTIVE AND OBJECTIVE BOX
Patient is a 42y old  Male who presents with a chief complaint of "Headache for one day" (24 Jul 2017 18:59)      SUBJECTIVE / OVERNIGHT EVENTS: sleeping, wife at bedside, HA improving    MEDICATIONS  (STANDING):  sodium chloride 0.9%. 1000 milliLiter(s) (100 mL/Hr) IV Continuous <Continuous>  pantoprazole    Tablet 40 milliGRAM(s) Oral before breakfast  acyclovir IVPB   IV Intermittent   acyclovir IVPB 850 milliGRAM(s) IV Intermittent every 8 hours  dihydroergotamine Injectable      diphenhydrAMINE   Injectable 25 milliGRAM(s) IV Push every 8 hours  metoclopramide Injectable 10 milliGRAM(s) IV Push every 8 hours  dihydroergotamine Injectable 1 milliGRAM(s) IV Push every 8 hours    MEDICATIONS  (PRN):  acetaminophen   Tablet 650 milliGRAM(s) Oral every 6 hours PRN For Temp greater than 38 C (100.4 F)  ketorolac   Injectable 30 milliGRAM(s) IV Push every 8 hours PRN Moderate Pain (4 - 6)      Vital Signs Last 24 Hrs  T(F): 98.2 (07-25-17 @ 12:12), Max: 98.2 (07-25-17 @ 12:12)  HR: 59 (07-25-17 @ 12:12) (59 - 76)  BP: 117/70 (07-25-17 @ 12:12) (99/61 - 127/71)  RR: 18 (07-25-17 @ 12:12) (16 - 18)  SpO2: 98% (07-25-17 @ 12:12) (98% - 100%)  Telemetry:   CAPILLARY BLOOD GLUCOSE        I&O's Summary    24 Jul 2017 07:01  -  25 Jul 2017 07:00  --------------------------------------------------------  IN: 1100 mL / OUT: 0 mL / NET: 1100 mL    25 Jul 2017 07:01  -  25 Jul 2017 19:18  --------------------------------------------------------  IN: 1817 mL / OUT: 0 mL / NET: 1817 mL        PHYSICAL EXAM:  GENERAL: NAD, well-developed  HEAD:  Atraumatic, Normocephalic  EYES: EOMI, PERRLA, conjunctiva and sclera clear  NECK: Supple, No JVD  CHEST/LUNG: Clear to auscultation bilaterally; No wheeze  HEART: Regular rate and rhythm; No murmurs, rubs, or gallops  ABDOMEN: Soft, Nontender, Nondistended; Bowel sounds present  EXTREMITIES:  2+ Peripheral Pulses, No clubbing, cyanosis, or edema  PSYCH: AAOx3  NEUROLOGY: non-focal  SKIN: No rashes or lesions    LABS:                        12.8   5.73  )-----------( 165      ( 25 Jul 2017 07:29 )             39.8     07-25    141  |  105  |  12  ----------------------------<  96  4.2   |  23  |  0.89    Ca    8.8      25 Jul 2017 07:27    TPro  6.3  /  Alb  3.6  /  TBili  0.2  /  DBili  x   /  AST  16  /  ALT  21  /  AlkPhos  39<L>  07-25    PT/INR - ( 25 Jul 2017 12:21 )   PT: 12.4 sec;   INR: 1.14 ratio         PTT - ( 25 Jul 2017 12:21 )  PTT:27.2 sec          RADIOLOGY & ADDITIONAL TESTS:    Imaging Personally Reviewed:    Consultant(s) Notes Reviewed:      Care Discussed with Consultants/Other Providers:

## 2017-07-25 NOTE — PROVIDER CONTACT NOTE (OTHER) - ACTION/TREATMENT ORDERED:
Lidya DENNY made aware. Initiate airborne precautions, maintain contact precaution
Lidya DENNY notified. Ordered x1 dose of ondansetron 4mg and dilaudid IV , take BP before administering medications. Ordered sumatriptan 100mg PO qday PRN for migraine. Continue to monitor.
Vaughn Means MD notified, ordered toradol 30mg IV, morphine 2mg. Continue to monitor.

## 2017-07-25 NOTE — PROVIDER CONTACT NOTE (OTHER) - SITUATION
Pt family states Pt family states 4mg morphine given in ED, tylenol 650mg PO, toradol 10mg PO did not resolve pt's pain/headache. Pt spouse states velproate 500mg IV given in ED helped resolve pt's pain, requests med

## 2017-07-25 NOTE — PROGRESS NOTE ADULT - SUBJECTIVE AND OBJECTIVE BOX
Patient is a 42y old  Male who presents with a chief complaint of "Headache for one day" (24 Jul 2017 18:59)    Being followed by ID for shingles    Interval history:Still with severe headache  Not much better  Now willing to have LP under fluoro  No other acute events      ROS:  No cough,SOB,CP  No N/V/D  No abd pain  No urinary complaints  No joint or limb pain  No other complaints      Antimicrobials:  valtrex 1 gm po q 8      Vital Signs Last 24 Hrs  T(C): 36.7 (07-25-17 @ 06:19), Max: 36.7 (07-24-17 @ 16:38)  T(F): 98.1 (07-25-17 @ 06:19), Max: 98.1 (07-24-17 @ 20:04)  HR: 63 (07-25-17 @ 06:19) (61 - 84)  BP: 105/61 (07-25-17 @ 06:19) (99/61 - 146/87)  BP(mean): --  RR: 16 (07-25-17 @ 06:19) (16 - 18)  SpO2: 98% (07-25-17 @ 06:19) (98% - 100%)    Physical Exam:    Constitutional well preserved,comfortable,pleasant    HEENT PERRLA EOMI,No pallor or icterus    No oral exudate or erythema    Neck supple no JVD or LN    Chest Good AE,CTA    CVS RRR S1 S2 WNl No murmur or rub or gallop    Abd soft BS normal No tenderness no masses    Ext No cyanosis clubbing or edema    L2-3 Shingles lesions in back-no crusting-some lesions across midline  No other lesions    IV site no erythema tenderness or discharge    Joints no swelling or LOM    CNS AAO X 3 no focal    Lab Data:                          12.8   5.73  )-----------( 165      ( 25 Jul 2017 07:29 )             39.8       07-25    141  |  105  |  12  ----------------------------<  96  4.2   |  23  |  0.89    Ca    8.8      25 Jul 2017 07:27    TPro  6.3  /  Alb  3.6  /  TBili  0.2  /  DBili  x   /  AST  16  /  ALT  21  /  AlkPhos  39<L>  07-25    HIV pending    Patient is a 42y old  Male who presents with a chief complaint of "Headache for one day" (24 Jul 2017 18:59)    Being followed by ID for    Interval history:  No other acute events      ROS:  No cough,SOB,CP  No N/V/D  No abd pain  No urinary complaints  No HA  No joint or limb pain  No other complaints      Antimicrobials:        Vital Signs Last 24 Hrs  T(C): 36.7 (07-25-17 @ 06:19), Max: 36.7 (07-24-17 @ 16:38)  T(F): 98.1 (07-25-17 @ 06:19), Max: 98.1 (07-24-17 @ 20:04)  HR: 63 (07-25-17 @ 06:19) (61 - 84)  BP: 105/61 (07-25-17 @ 06:19) (99/61 - 146/87)  BP(mean): --  RR: 16 (07-25-17 @ 06:19) (16 - 18)  SpO2: 98% (07-25-17 @ 06:19) (98% - 100%)    Physical Exam:    Constitutional well preserved,comfortable,pleasant    HEENT PERRLA EOMI,No pallor or icterus    No oral exudate or erythema    Neck supple no JVD or LN    Chest Good AE,CTA    CVS RRR S1 S2 WNl No murmur or rub or gallop    Abd soft BS normal No tenderness no masses    Ext No cyanosis clubbing or edema    IV site no erythema tenderness or discharge    Joints no swelling or LOM    CNS AAO X 3 no focal    Lab Data:                          12.8   5.73  )-----------( 165      ( 25 Jul 2017 07:29 )             39.8       07-25    141  |  105  |  12  ----------------------------<  96  4.2   |  23  |  0.89    Ca    8.8      25 Jul 2017 07:27    TPro  6.3  /  Alb  3.6  /  TBili  0.2  /  DBili  x   /  AST  16  /  ALT  21  /  AlkPhos  39<L>  07-25

## 2017-07-25 NOTE — PROVIDER CONTACT NOTE (OTHER) - SITUATION
Pt complaining of uncontrolled pain and n/v. Currently taking 2mg morphine IVP, ketorolac 30mg IVP for pain and Reglan 10mg IVP for n/v. 8/10 pain. Pt spouse requests medication for pt's migraine.

## 2017-07-25 NOTE — CHART NOTE - NSCHARTNOTEFT_GEN_A_CORE
Notified by RN; pt. is still complaining of headache. Pt. was recently admitted for severe migraine headache. Currently prescribed Tylenol 650 mg q6h PRN, Toradol 30 mg IV q8h PRN, and morphine IV 2 mg q8h PRN. Pt. was to be prescribed Sumatriptan 100 mg PRN as per H+P but order was never put in. Gave pt. dilaudid x 1 mg IV push for breakthrough pain and Zofran 4 mg IV for retractable nausea. Pt. placed on Sumatriptan 100 mg PRN. Ordered CT Head noncontrast to r/o bleed. Will continue to monitor pt. throughout the night. F/u w/ primary team in AM.     -Lidya Woods PA-C. #[id number]

## 2017-07-25 NOTE — PROVIDER CONTACT NOTE (OTHER) - BACKGROUND
Pt admitted for severe headache/migraine. PMH Shingles
Pt admitted for severe migraine/headache. PMH Shingles.
Pt admitted for severe headache/migraine. PMH shingles

## 2017-07-26 ENCOUNTER — TRANSCRIPTION ENCOUNTER (OUTPATIENT)
Age: 42
End: 2017-07-26

## 2017-07-26 VITALS
HEART RATE: 87 BPM | DIASTOLIC BLOOD PRESSURE: 89 MMHG | WEIGHT: 174.39 LBS | TEMPERATURE: 99 F | OXYGEN SATURATION: 98 % | RESPIRATION RATE: 18 BRPM | SYSTOLIC BLOOD PRESSURE: 143 MMHG

## 2017-07-26 DIAGNOSIS — G43.909 MIGRAINE, UNSPECIFIED, NOT INTRACTABLE, WITHOUT STATUS MIGRAINOSUS: ICD-10-CM

## 2017-07-26 LAB
ANION GAP SERPL CALC-SCNC: 15 MMOL/L — SIGNIFICANT CHANGE UP (ref 5–17)
BUN SERPL-MCNC: 8 MG/DL — SIGNIFICANT CHANGE UP (ref 7–23)
CALCIUM SERPL-MCNC: 9.3 MG/DL — SIGNIFICANT CHANGE UP (ref 8.4–10.5)
CHLORIDE SERPL-SCNC: 103 MMOL/L — SIGNIFICANT CHANGE UP (ref 96–108)
CO2 SERPL-SCNC: 22 MMOL/L — SIGNIFICANT CHANGE UP (ref 22–31)
CREAT SERPL-MCNC: 1 MG/DL — SIGNIFICANT CHANGE UP (ref 0.5–1.3)
GLUCOSE SERPL-MCNC: 94 MG/DL — SIGNIFICANT CHANGE UP (ref 70–99)
HCT VFR BLD CALC: 39.5 % — SIGNIFICANT CHANGE UP (ref 39–50)
HGB BLD-MCNC: 13.1 G/DL — SIGNIFICANT CHANGE UP (ref 13–17)
MCHC RBC-ENTMCNC: 26.6 PG — LOW (ref 27–34)
MCHC RBC-ENTMCNC: 33.2 GM/DL — SIGNIFICANT CHANGE UP (ref 32–36)
MCV RBC AUTO: 80.3 FL — SIGNIFICANT CHANGE UP (ref 80–100)
PLATELET # BLD AUTO: 174 K/UL — SIGNIFICANT CHANGE UP (ref 150–400)
POTASSIUM SERPL-MCNC: 3.8 MMOL/L — SIGNIFICANT CHANGE UP (ref 3.5–5.3)
POTASSIUM SERPL-SCNC: 3.8 MMOL/L — SIGNIFICANT CHANGE UP (ref 3.5–5.3)
RBC # BLD: 4.92 M/UL — SIGNIFICANT CHANGE UP (ref 4.2–5.8)
RBC # FLD: 13.8 % — SIGNIFICANT CHANGE UP (ref 10.3–14.5)
SODIUM SERPL-SCNC: 140 MMOL/L — SIGNIFICANT CHANGE UP (ref 135–145)
WBC # BLD: 4.97 K/UL — SIGNIFICANT CHANGE UP (ref 3.8–10.5)
WBC # FLD AUTO: 4.97 K/UL — SIGNIFICANT CHANGE UP (ref 3.8–10.5)

## 2017-07-26 PROCEDURE — 84132 ASSAY OF SERUM POTASSIUM: CPT

## 2017-07-26 PROCEDURE — 84295 ASSAY OF SERUM SODIUM: CPT

## 2017-07-26 PROCEDURE — 85027 COMPLETE CBC AUTOMATED: CPT

## 2017-07-26 PROCEDURE — 85610 PROTHROMBIN TIME: CPT

## 2017-07-26 PROCEDURE — 82330 ASSAY OF CALCIUM: CPT

## 2017-07-26 PROCEDURE — 83605 ASSAY OF LACTIC ACID: CPT

## 2017-07-26 PROCEDURE — 85014 HEMATOCRIT: CPT

## 2017-07-26 PROCEDURE — 82803 BLOOD GASES ANY COMBINATION: CPT

## 2017-07-26 PROCEDURE — 70450 CT HEAD/BRAIN W/O DYE: CPT

## 2017-07-26 PROCEDURE — 85730 THROMBOPLASTIN TIME PARTIAL: CPT

## 2017-07-26 PROCEDURE — 80048 BASIC METABOLIC PNL TOTAL CA: CPT

## 2017-07-26 PROCEDURE — 96375 TX/PRO/DX INJ NEW DRUG ADDON: CPT

## 2017-07-26 PROCEDURE — 82435 ASSAY OF BLOOD CHLORIDE: CPT

## 2017-07-26 PROCEDURE — 87389 HIV-1 AG W/HIV-1&-2 AB AG IA: CPT

## 2017-07-26 PROCEDURE — 82947 ASSAY GLUCOSE BLOOD QUANT: CPT

## 2017-07-26 PROCEDURE — 96374 THER/PROPH/DIAG INJ IV PUSH: CPT

## 2017-07-26 PROCEDURE — 99285 EMERGENCY DEPT VISIT HI MDM: CPT | Mod: 25

## 2017-07-26 PROCEDURE — G0378: CPT

## 2017-07-26 PROCEDURE — 80053 COMPREHEN METABOLIC PANEL: CPT

## 2017-07-26 RX ORDER — SUMATRIPTAN SUCCINATE 4 MG/.5ML
1 INJECTION, SOLUTION SUBCUTANEOUS
Qty: 30 | Refills: 0 | OUTPATIENT
Start: 2017-07-26 | End: 2017-08-25

## 2017-07-26 RX ORDER — VALACYCLOVIR 500 MG/1
1 TABLET, FILM COATED ORAL
Qty: 21 | Refills: 0 | OUTPATIENT
Start: 2017-07-26 | End: 2017-08-02

## 2017-07-26 RX ORDER — SUMATRIPTAN SUCCINATE 4 MG/.5ML
6 INJECTION, SOLUTION SUBCUTANEOUS
Qty: 0 | Refills: 0 | COMMUNITY

## 2017-07-26 RX ORDER — METOCLOPRAMIDE HCL 10 MG
1 TABLET ORAL
Qty: 0 | Refills: 0 | COMMUNITY

## 2017-07-26 RX ORDER — METOCLOPRAMIDE HCL 10 MG
1 TABLET ORAL
Qty: 120 | Refills: 0 | OUTPATIENT
Start: 2017-07-26 | End: 2017-08-25

## 2017-07-26 RX ORDER — IBUPROFEN 200 MG
0 TABLET ORAL
Qty: 0 | Refills: 0 | COMMUNITY

## 2017-07-26 RX ORDER — DIHYDROERGOTAMINE MESYLATE 1 MG/ML
1 INJECTION INTRAMUSCULAR; INTRAVENOUS; SUBCUTANEOUS
Qty: 2 | Refills: 0 | OUTPATIENT
Start: 2017-07-26 | End: 2017-08-25

## 2017-07-26 RX ORDER — SUMATRIPTAN SUCCINATE 4 MG/.5ML
1 INJECTION, SOLUTION SUBCUTANEOUS
Qty: 0 | Refills: 0 | COMMUNITY

## 2017-07-26 RX ORDER — LIDOCAINE 4 G/100G
1 CREAM TOPICAL
Qty: 0 | Refills: 0 | COMMUNITY

## 2017-07-26 RX ADMIN — DIHYDROERGOTAMINE MESYLATE 1 MILLIGRAM(S): 1 INJECTION INTRAMUSCULAR; INTRAVENOUS; SUBCUTANEOUS at 02:38

## 2017-07-26 RX ADMIN — Medication 25 MILLIGRAM(S): at 01:49

## 2017-07-26 RX ADMIN — Medication 267 MILLIGRAM(S): at 05:08

## 2017-07-26 RX ADMIN — Medication 10 MILLIGRAM(S): at 01:49

## 2017-07-26 RX ADMIN — DIHYDROERGOTAMINE MESYLATE 1 MILLIGRAM(S): 1 INJECTION INTRAMUSCULAR; INTRAVENOUS; SUBCUTANEOUS at 02:23

## 2017-07-26 NOTE — PROGRESS NOTE ADULT - SUBJECTIVE AND OBJECTIVE BOX
Patient is a 42y old  Male who presents with a chief complaint of "Headache for one day" (26 Jul 2017 10:09)    Being followed by ID for zoster    Interval history:refused LP yesterday  says headache is improved  No acute events      ROS:  No cough,SOB,CP  No N/V/D./abd pain  No other complaints      Antimicrobials:    acyclovir IVPB   IV Intermittent   acyclovir IVPB 850 milliGRAM(s) IV Intermittent every 8 hours      Vital Signs Last 24 Hrs  T(C): 37.2 (07-26-17 @ 05:03), Max: 37.2 (07-26-17 @ 05:03)  T(F): 99 (07-26-17 @ 05:03), Max: 99 (07-26-17 @ 05:03)  HR: 87 (07-26-17 @ 11:20) (52 - 87)  BP: 143/89 (07-26-17 @ 11:20) (105/50 - 143/89)  BP(mean): --  RR: 18 (07-26-17 @ 11:20) (18 - 18)  SpO2: 93% (07-26-17 @ 11:20) (93% - 100%)    Physical Exam:        HEENT PERRLA EOMI,No pallor or icterus    No oral exudate or erythema    Neck supple no JVD or LN    Chest Good AE,CTA    CVS RRR S1 S2 WNl No murmur or rub or gallop    Abd soft BS normal No tenderness no masses    Ext No cyanosis clubbing or edema    IV site no erythema tenderness or discharge    Joints no swelling or LOM  Skin zoster lesions on back-no crusting-no new lesions no     CNS AAO X 3 no focal    Lab Data:                          13.1   4.97  )-----------( 174      ( 26 Jul 2017 08:42 )             39.5       07-26    140  |  103  |  8   ----------------------------<  94  3.8   |  22  |  1.00    Ca    9.3      26 Jul 2017 08:39    TPro  6.3  /  Alb  3.6  /  TBili  0.2  /  DBili  x   /  AST  16  /  ALT  21  /  AlkPhos  39<L>  07-25      HIV-1/2 Antigen/Antibody Screen by [facility] (07.25.17 @ 07:27)    HIV-1/2 Combo Result: Nonreact: The HIV Ag/Ab Combo test performed screens for HIV-1 p24 antigen,  antibodies to HIV-1 (group M and group O), and antibodies to HIV-2. All  specimens repeatedly reactive will reflex to an HIV 1/2 antibody  confirmation and differentiation test. This aNonreact: ssay detects p24 antigen  which may be present prior to the development of HIV antibodies,  therefore a reactive result with a negative HIV 1/2 AB Confirmation  should be followed up with HIV-1 RNA, HIV-2 RNA and repeat testing in 4-8  weeks. A nonreaNonreact: ctive result does not preclude previous exposure to or  infection with HIV-1 or HIV-2. [facility] prohibits disclosure of this  result to any unauthorized party.

## 2017-07-26 NOTE — DISCHARGE NOTE ADULT - MEDICATION SUMMARY - MEDICATIONS TO STOP TAKING
I will STOP taking the medications listed below when I get home from the hospital:    SUMAtriptan 6 mg/0.5 mL subcutaneous solution  -- 6 milligram(s) subcutaneous once a day, As Needed    lidocaine 5% topical ointment  -- Apply on skin to affected area 3 times a day, As Needed

## 2017-07-26 NOTE — DISCHARGE NOTE ADULT - CARE PLAN
Principal Discharge DX:	Intractable migraine without aura and with status migrainosus  Goal:	Headache resolves  Instructions for follow-up, activity and diet:	Your headache was treated with dihydroergotamine intravenously and now resolved.  If the migraine headache re-occur, you can continue to use sumatriptan as directed by your primary doctor.  Follow up with your primary healthcare provider as needed.  Secondary Diagnosis:	Herpes zoster without complication  Instructions for follow-up, activity and diet:	You are being treated with valacyclovir; You received a few doses of IV acyclovir while hospitalized.  Continue with valacyclovir as directed.  Follow up with your primary healthcare provider in 1-2 weeks.  Call for appointment. Principal Discharge DX:	Intractable migraine without aura and with status migrainosus  Goal:	Headache resolves  Instructions for follow-up, activity and diet:	Your headache was treated with dihydroergotamine (DHE) intravenously and now resolved.  If the migraine headache re-occur, you can continue to use sumatriptan as directed by your primary doctor.  Follow up with your primary healthcare provider as needed.  Secondary Diagnosis:	Herpes zoster without complication  Instructions for follow-up, activity and diet:	You are being treated with valacyclovir; You received a few doses of IV acyclovir while hospitalized.  Continue with valacyclovir as directed.  Follow up with your primary healthcare provider in 1-2 weeks.  Call for appointment. Principal Discharge DX:	Intractable migraine without aura and with status migrainosus  Goal:	Headache resolves  Instructions for follow-up, activity and diet:	Your headache was treated with dihydroergotamine (DHE) intravenously and now resolved.  If the migraine headache re-occur, you can continue to use sumatriptan or DHE if pain is intractable and as directed by your primary doctor.  Follow up with your primary healthcare provider as needed.  Secondary Diagnosis:	Herpes zoster without complication  Instructions for follow-up, activity and diet:	You are being treated with valacyclovir; You received a few doses of IV acyclovir while hospitalized.  Continue with valacyclovir as directed.  Follow up with your primary healthcare provider in 1-2 weeks.  Call for appointment.

## 2017-07-26 NOTE — PROGRESS NOTE ADULT - SUBJECTIVE AND OBJECTIVE BOX
Patient is a 42y old  Male who presents with a chief complaint of "Headache for one day" (26 Jul 2017 10:09)      SUBJECTIVE / OVERNIGHT EVENTS: HA resolved    MEDICATIONS  (STANDING):  sodium chloride 0.9%. 1000 milliLiter(s) (100 mL/Hr) IV Continuous <Continuous>  pantoprazole    Tablet 40 milliGRAM(s) Oral before breakfast  acyclovir IVPB   IV Intermittent   acyclovir IVPB 850 milliGRAM(s) IV Intermittent every 8 hours  dihydroergotamine Injectable      diphenhydrAMINE   Injectable 25 milliGRAM(s) IV Push every 8 hours  metoclopramide Injectable 10 milliGRAM(s) IV Push every 8 hours  dihydroergotamine Injectable 1 milliGRAM(s) IV Push every 8 hours    MEDICATIONS  (PRN):  acetaminophen   Tablet 650 milliGRAM(s) Oral every 6 hours PRN For Temp greater than 38 C (100.4 F)  ketorolac   Injectable 30 milliGRAM(s) IV Push every 8 hours PRN Moderate Pain (4 - 6)      Vital Signs Last 24 Hrs  T(F): 99.1 (07-26-17 @ 11:20), Max: 99.1 (07-26-17 @ 11:20)  HR: 87 (07-26-17 @ 11:20) (52 - 87)  BP: 143/89 (07-26-17 @ 11:20) (105/50 - 143/89)  RR: 18 (07-26-17 @ 11:20) (18 - 18)  SpO2: 98% (07-26-17 @ 11:20) (98% - 100%)  Telemetry:   CAPILLARY BLOOD GLUCOSE        I&O's Summary    25 Jul 2017 07:01  -  26 Jul 2017 07:00  --------------------------------------------------------  IN: 3517 mL / OUT: 0 mL / NET: 3517 mL    26 Jul 2017 07:01  -  26 Jul 2017 23:44  --------------------------------------------------------  IN: 240 mL / OUT: 0 mL / NET: 240 mL        PHYSICAL EXAM:  GENERAL: NAD, well-developed  HEAD:  Atraumatic, Normocephalic  EYES: EOMI, PERRLA, conjunctiva and sclera clear  NECK: Supple, No JVD  CHEST/LUNG: Clear to auscultation bilaterally; No wheeze  HEART: Regular rate and rhythm; No murmurs, rubs, or gallops  ABDOMEN: Soft, Nontender, Nondistended; Bowel sounds present  EXTREMITIES:  2+ Peripheral Pulses, No clubbing, cyanosis, or edema  PSYCH: AAOx3  NEUROLOGY: non-focal  SKIN: No rashes or lesions    LABS:                        13.1   4.97  )-----------( 174      ( 26 Jul 2017 08:42 )             39.5     07-26    140  |  103  |  8   ----------------------------<  94  3.8   |  22  |  1.00    Ca    9.3      26 Jul 2017 08:39    TPro  6.3  /  Alb  3.6  /  TBili  0.2  /  DBili  x   /  AST  16  /  ALT  21  /  AlkPhos  39<L>  07-25    PT/INR - ( 25 Jul 2017 12:21 )   PT: 12.4 sec;   INR: 1.14 ratio         PTT - ( 25 Jul 2017 12:21 )  PTT:27.2 sec          RADIOLOGY & ADDITIONAL TESTS:    Imaging Personally Reviewed:    Consultant(s) Notes Reviewed:      Care Discussed with Consultants/Other Providers:

## 2017-07-26 NOTE — PROGRESS NOTE ADULT - SUBJECTIVE AND OBJECTIVE BOX
Subjective:Interval History - No events overnight. No further headche    Objective:   Vital Signs Last 24 Hrs  T(C): 37.3 (26 Jul 2017 11:20), Max: 37.3 (26 Jul 2017 11:20)  T(F): 99.1 (26 Jul 2017 11:20), Max: 99.1 (26 Jul 2017 11:20)  HR: 87 (26 Jul 2017 11:20) (52 - 87)  BP: 143/89 (26 Jul 2017 11:20) (105/50 - 143/89)  BP(mean): --  RR: 18 (26 Jul 2017 11:20) (18 - 18)  SpO2: 98% (26 Jul 2017 11:20) (98% - 100%)    General Exam:   General appearance: No acute distress                   Neurological Exam:  Mental Status: Orientated to self, date and place.  Attention intact.  No dysarthria, aphasia or neglect.  Knowledge intact.  Registration intact.  Short and long term memory grossly intact.      Cranial Nerves: CN I - not tested.  PERRL, EOMI, VFF, no nystagmus or diplopia.  No APD.  Fundi not visualized bilaterally.  CN V1-3 intact to light touch and pinprick.  No facial asymmetry.  Hearing intact to finger rub bilaterally.  Tongue, uvula and palate midline.  Sternocleidomastoid and Trapezius intact bilaterally.    Motor:   Tone: normal.                  Strength: intact throughout  Pronator drift: none                 Dysmeria: None to finger-nose-finger or heel-shin-heel  No truncal ataxia.    Tremor: No resting, postural or action tremor.  No myoclonus.    Sensation: intact to light touch, pinprick, vibration and proprioception    Deep Tendon Reflexes: 1+ bilateral biceps, triceps, brachioradialis, knee and ankle  Toes flexor bilaterally    Gait: normal and stable.      Other:    07-26    140  |  103  |  8   ----------------------------<  94  3.8   |  22  |  1.00    Ca    9.3      26 Jul 2017 08:39    TPro  6.3  /  Alb  3.6  /  TBili  0.2  /  DBili  x   /  AST  16  /  ALT  21  /  AlkPhos  39<L>  07-25    LIVER FUNCTIONS - ( 25 Jul 2017 07:27 )  Alb: 3.6 g/dL / Pro: 6.3 g/dL / ALK PHOS: 39 U/L / ALT: 21 U/L / AST: 16 U/L / GGT: x                                 13.1   4.97  )-----------( 174      ( 26 Jul 2017 08:42 )             39.5       MEDICATIONS  (STANDING):  sodium chloride 0.9%. 1000 milliLiter(s) (100 mL/Hr) IV Continuous <Continuous>  pantoprazole    Tablet 40 milliGRAM(s) Oral before breakfast  acyclovir IVPB   IV Intermittent   acyclovir IVPB 850 milliGRAM(s) IV Intermittent every 8 hours  dihydroergotamine Injectable      diphenhydrAMINE   Injectable 25 milliGRAM(s) IV Push every 8 hours  metoclopramide Injectable 10 milliGRAM(s) IV Push every 8 hours  dihydroergotamine Injectable 1 milliGRAM(s) IV Push every 8 hours    MEDICATIONS  (PRN):  acetaminophen   Tablet 650 milliGRAM(s) Oral every 6 hours PRN For Temp greater than 38 C (100.4 F)  ketorolac   Injectable 30 milliGRAM(s) IV Push every 8 hours PRN Moderate Pain (4 - 6)

## 2017-07-26 NOTE — PROGRESS NOTE ADULT - PROBLEM SELECTOR PROBLEM 1
Intractable migraine without aura and with status migrainosus
Migraine
Intractable migraine without aura and with status migrainosus

## 2017-07-26 NOTE — PROGRESS NOTE ADULT - ASSESSMENT
41 y/o man w/ PMH migraine p/w headache since this AM similar in quality to his previous migraines, acc with n/v,  however more severe and difficult to treat. No meningeal signs on exam, neurologic exam is non-focal. Symptoms likely 2/2 migraine exacerbation. ID recommended an LP, ptn refused. Clinically HA  doesn't appear to be 2/2 meningitis/encephalitis  .  Pt appears to have status migrainosus.  Has occurred to lesser extent in past.  Has taken sumatriptan, now reglan, toradol, and some depakote with minimal relief.  Neuro recommended additional doses of VPA, toradol when due, and reglan, then DHE protocol.    IV Acyclovir  for shingles as per ID, DC planning when HA improves and cleared by Neuro AND ID for DC
43 y/o man w/ PMH migraine p/w headache since this AM similar in quality to his previous migraines, acc with n/v,  however more severe and difficult to treat. No meningeal signs on exam, neurologic exam is non-focal. Symptoms likely 2/2 migraine exacerbation. ID recommended an LP, ptn refused. Clinically HA  doesn't appear to be 2/2 meningitis/encephalitis  .  Pt is s/p status migrainosus.  Has occurred to lesser extent in past.  HA now resolved, cleared by neuro and ID for DC. cont Valtrex for shingles for a week.
43 yo male presents with migraine. Improved after DHE and Depakote.
Immunocompetent patient(HIV negative) with single/2 contiguousdermatome zoster  Headache-has h/o migraines in past.-now resolved completely  No confusion, HMF normal  No fever  No meningeal signs  rash no progression,not crusted  Otherwise non toxic appearance.  patient refused Lp again  headache improved    Rec:  Given above can switch to po valtrex,continue x 7 days  pain per primary  ID will follow as needed,please call [phone] if any questions or issues.  case d/w Med NP
Immunocompetent patient(by history,HIV pending) with single/2 contiguousdermatome zoster  Headache-has h/o migraines in past.  No confusion, HMF normal  No fever  No meningeal signs  rash no progression,not crusted  Otherwise non toxic appearance.  Most likely HA due to migraine v reactive but can definitely rule out VZV meningitis with LP,patient and family refused yestersday .  Not much change in headache  Now willing to do LP    Rec:  1) LP ,check CSF wbc,protein ,glucose,VZV pcr  2) Follow HIV  3) Given above will switch to IV acyclovir,monitor creatinine.IV hydration  4) pain per primary.  Tailor plan per results,course  Case d/w primary Med Np,family.  Will Follow.  Beeper [id number]-lrus/afterhours/No response-[id number]

## 2017-07-26 NOTE — DISCHARGE NOTE ADULT - HOSPITAL COURSE
*** TO BE COMPLETED BY ATTENDING PHYSICIAN *** 43 y/o man w/ PMH migraine p/w headache since this AM similar in quality to his previous migraines, acc with n/v,  however more severe and difficult to treat. No meningeal signs on exam, neurologic exam is non-focal. Symptoms likely 2/2 migraine exacerbation. ID recommended an LP, ptn refused. Clinically HA  doesn't appear to be 2/2 meningitis/encephalitis  .  Pt is s/p status migrainosus.  Has occurred to lesser extent in past.  HA now resolved, cleared by neuro and ID for DC. cont Valtrex for shingles for a week.     Problem/Plan - 1:  ·  Problem: Intractable migraine without aura and with status migrainosum, resolved

## 2017-07-26 NOTE — DISCHARGE NOTE ADULT - MEDICATION SUMMARY - MEDICATIONS TO TAKE
I will START or STAY ON the medications listed below when I get home from the hospital:    Advil 200 mg oral tablet  --  by mouth , As Needed for pain and/or fever  -- Indication: For pain or fever    SUMAtriptan 100 mg oral tablet  -- 1 tab(s) by mouth once a day, As Needed  -- Indication: For Migraine    metoclopramide 10 mg oral tablet  -- 1 tab(s) by mouth 3 times a day, As Needed for nausea/vomiting  -- Indication: For Nausea/vomit    valACYclovir 1 g oral tablet  -- 1 tab(s) by mouth 3 times a day for 7 days  -- Indication: For Herpes zoster without complication I will START or STAY ON the medications listed below when I get home from the hospital:    Advil 200 mg oral tablet  --  by mouth , As Needed for pain and/or fever  -- Indication: For pain or fever    SUMAtriptan 100 mg oral tablet  -- 1 tab(s) by mouth once a day, As Needed  -- Indication: For Migraine    dihydroergotamine 4 mg/mL nasal spray  -- 1 spray(s) into nose every 15 minutes  up to 4 sprays in a 24H period  -- Do not take this drug if you are pregnant.  For the nose.    -- Indication: For Intractable migraine without aura and with status migrainosus    Reglan 10 mg oral tablet  -- 1 tab(s) by mouth 4 times a day (before meals and at bedtime), As Needed for nausea/vomit  -- Indication: For Nausea/vomit    valACYclovir 1 g oral tablet  -- 1 tab(s) by mouth 3 times a day for 7 days  -- Indication: For Herpes zoster without complication

## 2017-07-26 NOTE — DISCHARGE NOTE ADULT - PLAN OF CARE
Headache resolves Your headache was treated with dihydroergotamine intravenously and now resolved.  If the migraine headache re-occur, you can continue to use sumatriptan as directed by your primary doctor.  Follow up with your primary healthcare provider as needed. You are being treated with valacyclovir; You received a few doses of IV acyclovir while hospitalized.  Continue with valacyclovir as directed.  Follow up with your primary healthcare provider in 1-2 weeks.  Call for appointment. Your headache was treated with dihydroergotamine (DHE) intravenously and now resolved.  If the migraine headache re-occur, you can continue to use sumatriptan as directed by your primary doctor.  Follow up with your primary healthcare provider as needed. Your headache was treated with dihydroergotamine (DHE) intravenously and now resolved.  If the migraine headache re-occur, you can continue to use sumatriptan or DHE if pain is intractable and as directed by your primary doctor.  Follow up with your primary healthcare provider as needed.

## 2017-07-26 NOTE — DISCHARGE NOTE ADULT - PATIENT PORTAL LINK FT
“You can access the [facility] Patient Portal, offered by [facility], by registering with the following website: [email or web address]”

## 2017-12-15 PROBLEM — G43.909 MIGRAINE, UNSPECIFIED, NOT INTRACTABLE, WITHOUT STATUS MIGRAINOSUS: Chronic | Status: ACTIVE | Noted: 2017-07-24

## 2017-12-19 ENCOUNTER — APPOINTMENT (OUTPATIENT)
Dept: SURGERY | Facility: CLINIC | Age: 42
End: 2017-12-19
Payer: COMMERCIAL

## 2017-12-19 VITALS
HEIGHT: 69 IN | RESPIRATION RATE: 15 BRPM | OXYGEN SATURATION: 99 % | BODY MASS INDEX: 27.4 KG/M2 | DIASTOLIC BLOOD PRESSURE: 74 MMHG | WEIGHT: 185 LBS | HEART RATE: 69 BPM | SYSTOLIC BLOOD PRESSURE: 120 MMHG | TEMPERATURE: 98.8 F

## 2017-12-19 DIAGNOSIS — K21.9 GASTRO-ESOPHAGEAL REFLUX DISEASE W/OUT ESOPHAGITIS: ICD-10-CM

## 2017-12-19 DIAGNOSIS — Z84.1 FAMILY HISTORY OF DISORDERS OF KIDNEY AND URETER: ICD-10-CM

## 2017-12-19 DIAGNOSIS — Z78.9 OTHER SPECIFIED HEALTH STATUS: ICD-10-CM

## 2017-12-19 DIAGNOSIS — Z83.79 FAMILY HISTORY OF OTHER DISEASES OF THE DIGESTIVE SYSTEM: ICD-10-CM

## 2017-12-19 DIAGNOSIS — Z87.19 PERSONAL HISTORY OF OTHER DISEASES OF THE DIGESTIVE SYSTEM: ICD-10-CM

## 2017-12-19 DIAGNOSIS — Z86.69 PERSONAL HISTORY OF OTHER DISEASES OF THE NERVOUS SYSTEM AND SENSE ORGANS: ICD-10-CM

## 2017-12-19 PROCEDURE — 99244 OFF/OP CNSLTJ NEW/EST MOD 40: CPT

## 2017-12-19 RX ORDER — SUMATRIPTAN SUCCINATE 100 MG/1
TABLET, FILM COATED ORAL
Refills: 0 | Status: ACTIVE | COMMUNITY

## 2017-12-19 RX ORDER — ESOMEPRAZOLE MAGNESIUM 40 MG/1
40 GRANULE, DELAYED RELEASE ORAL
Refills: 0 | Status: ACTIVE | COMMUNITY

## 2018-01-02 ENCOUNTER — OUTPATIENT (OUTPATIENT)
Dept: OUTPATIENT SERVICES | Facility: HOSPITAL | Age: 43
LOS: 1 days | End: 2018-01-02
Payer: COMMERCIAL

## 2018-01-02 VITALS
TEMPERATURE: 98 F | HEART RATE: 77 BPM | RESPIRATION RATE: 16 BRPM | WEIGHT: 188.05 LBS | OXYGEN SATURATION: 98 % | SYSTOLIC BLOOD PRESSURE: 130 MMHG | HEIGHT: 69 IN | DIASTOLIC BLOOD PRESSURE: 82 MMHG

## 2018-01-02 DIAGNOSIS — K81.9 CHOLECYSTITIS, UNSPECIFIED: ICD-10-CM

## 2018-01-02 DIAGNOSIS — R10.0 ACUTE ABDOMEN: ICD-10-CM

## 2018-01-02 DIAGNOSIS — Z86.79 PERSONAL HISTORY OF OTHER DISEASES OF THE CIRCULATORY SYSTEM: Chronic | ICD-10-CM

## 2018-01-02 DIAGNOSIS — Z01.818 ENCOUNTER FOR OTHER PREPROCEDURAL EXAMINATION: ICD-10-CM

## 2018-01-02 DIAGNOSIS — Z98.890 OTHER SPECIFIED POSTPROCEDURAL STATES: Chronic | ICD-10-CM

## 2018-01-02 LAB
HCT VFR BLD CALC: 44.7 % — SIGNIFICANT CHANGE UP (ref 39–50)
HGB BLD-MCNC: 14.4 G/DL — SIGNIFICANT CHANGE UP (ref 13–17)
MCHC RBC-ENTMCNC: 26.6 PG — LOW (ref 27–34)
MCHC RBC-ENTMCNC: 32.2 GM/DL — SIGNIFICANT CHANGE UP (ref 32–36)
MCV RBC AUTO: 82.6 FL — SIGNIFICANT CHANGE UP (ref 80–100)
PLATELET # BLD AUTO: 234 K/UL — SIGNIFICANT CHANGE UP (ref 150–400)
RBC # BLD: 5.41 M/UL — SIGNIFICANT CHANGE UP (ref 4.2–5.8)
RBC # FLD: 13.9 % — SIGNIFICANT CHANGE UP (ref 10.3–14.5)
WBC # BLD: 6.33 K/UL — SIGNIFICANT CHANGE UP (ref 3.8–10.5)
WBC # FLD AUTO: 6.33 K/UL — SIGNIFICANT CHANGE UP (ref 3.8–10.5)

## 2018-01-02 PROCEDURE — 85027 COMPLETE CBC AUTOMATED: CPT

## 2018-01-02 PROCEDURE — G0463: CPT

## 2018-01-02 RX ORDER — SODIUM CHLORIDE 9 MG/ML
1000 INJECTION, SOLUTION INTRAVENOUS
Qty: 0 | Refills: 0 | Status: DISCONTINUED | OUTPATIENT
Start: 2018-01-03 | End: 2018-01-18

## 2018-01-02 RX ORDER — LIDOCAINE HCL 20 MG/ML
0.2 VIAL (ML) INJECTION ONCE
Qty: 0 | Refills: 0 | Status: DISCONTINUED | OUTPATIENT
Start: 2018-01-03 | End: 2018-01-18

## 2018-01-02 RX ORDER — IBUPROFEN 200 MG
2 TABLET ORAL
Qty: 0 | Refills: 0 | COMMUNITY

## 2018-01-02 RX ORDER — CEFAZOLIN SODIUM 1 G
2000 VIAL (EA) INJECTION ONCE
Qty: 0 | Refills: 0 | Status: DISCONTINUED | OUTPATIENT
Start: 2018-01-03 | End: 2018-01-18

## 2018-01-02 RX ORDER — ACETAMINOPHEN 500 MG
1000 TABLET ORAL ONCE
Qty: 0 | Refills: 0 | Status: COMPLETED | OUTPATIENT
Start: 2018-01-03 | End: 2018-01-03

## 2018-01-02 RX ORDER — OXYCODONE HYDROCHLORIDE 5 MG/1
5 TABLET ORAL ONCE
Qty: 0 | Refills: 0 | Status: DISCONTINUED | OUTPATIENT
Start: 2018-01-03 | End: 2018-01-03

## 2018-01-02 RX ORDER — SODIUM CHLORIDE 9 MG/ML
3 INJECTION INTRAMUSCULAR; INTRAVENOUS; SUBCUTANEOUS EVERY 8 HOURS
Qty: 0 | Refills: 0 | Status: DISCONTINUED | OUTPATIENT
Start: 2018-01-03 | End: 2018-01-18

## 2018-01-02 RX ORDER — ONDANSETRON 8 MG/1
4 TABLET, FILM COATED ORAL ONCE
Qty: 0 | Refills: 0 | Status: COMPLETED | OUTPATIENT
Start: 2018-01-03 | End: 2018-01-03

## 2018-01-02 NOTE — H&P PST ADULT - HISTORY OF PRESENT ILLNESS
Patient is a 42 y.o. male with h/o ER visit for severe epigastric pain about 3 weeks ago. He underwent a CT scan and ultrasound of abdomen and pelvis which revealed a stone in the gallbladder neck and pericholecystic fluid consistent with acute cholecystitis. His symptoms resolved, he was discharged home and has remained asymptomatic. He is scheduled for Laparoscopic Cholecystectomy. Patient is a 42 y.o. male with h/o ER visit for severe epigastric pain about 3 weeks ago. He underwent a CT scan and an ultrasound of abdomen and pelvis which revealed a stone in the gallbladder neck and pericholecystic fluid consistent with acute cholecystitis. His symptoms resolved, he was discharged home and has remained asymptomatic since his discharge. Patient is scheduled for Laparoscopic Cholecystectomy.

## 2018-01-02 NOTE — H&P PST ADULT - PSH
No significant past surgical history H/O left inguinal hernia repair  2015  History of varicocele  repair 1987

## 2018-01-02 NOTE — H&P PST ADULT - NSANTHOSAYNRD_GEN_A_CORE
No. LOKI screening performed.  STOP BANG Legend: 0-2 = LOW Risk; 3-4 = INTERMEDIATE Risk; 5-8 = HIGH Risk

## 2018-01-02 NOTE — H&P PST ADULT - PMH
Migraine    Shingles Acid reflux    Cholecystitis    IBS (irritable bowel syndrome)    Left inguinal hernia    Migraine    Shingles  07/2017

## 2018-01-03 ENCOUNTER — CHART COPY (OUTPATIENT)
Age: 43
End: 2018-01-03

## 2018-01-03 ENCOUNTER — RESULT REVIEW (OUTPATIENT)
Age: 43
End: 2018-01-03

## 2018-01-03 ENCOUNTER — APPOINTMENT (OUTPATIENT)
Dept: SURGERY | Facility: HOSPITAL | Age: 43
End: 2018-01-03
Payer: COMMERCIAL

## 2018-01-03 ENCOUNTER — OUTPATIENT (OUTPATIENT)
Dept: OUTPATIENT SERVICES | Facility: HOSPITAL | Age: 43
LOS: 1 days | End: 2018-01-03
Payer: COMMERCIAL

## 2018-01-03 ENCOUNTER — TRANSCRIPTION ENCOUNTER (OUTPATIENT)
Age: 43
End: 2018-01-03

## 2018-01-03 VITALS
TEMPERATURE: 97 F | OXYGEN SATURATION: 97 % | HEART RATE: 85 BPM | RESPIRATION RATE: 16 BRPM | SYSTOLIC BLOOD PRESSURE: 124 MMHG | DIASTOLIC BLOOD PRESSURE: 60 MMHG

## 2018-01-03 VITALS
WEIGHT: 188.05 LBS | OXYGEN SATURATION: 100 % | DIASTOLIC BLOOD PRESSURE: 82 MMHG | RESPIRATION RATE: 16 BRPM | SYSTOLIC BLOOD PRESSURE: 130 MMHG | HEIGHT: 69 IN | HEART RATE: 77 BPM | TEMPERATURE: 98 F

## 2018-01-03 DIAGNOSIS — R10.0 ACUTE ABDOMEN: ICD-10-CM

## 2018-01-03 DIAGNOSIS — Z86.79 PERSONAL HISTORY OF OTHER DISEASES OF THE CIRCULATORY SYSTEM: Chronic | ICD-10-CM

## 2018-01-03 DIAGNOSIS — K81.9 CHOLECYSTITIS, UNSPECIFIED: ICD-10-CM

## 2018-01-03 DIAGNOSIS — Z98.890 OTHER SPECIFIED POSTPROCEDURAL STATES: Chronic | ICD-10-CM

## 2018-01-03 PROBLEM — B02.9 ZOSTER WITHOUT COMPLICATIONS: Chronic | Status: ACTIVE | Noted: 2017-07-24

## 2018-01-03 PROCEDURE — 88304 TISSUE EXAM BY PATHOLOGIST: CPT | Mod: 26

## 2018-01-03 PROCEDURE — 49585: CPT | Mod: 59

## 2018-01-03 PROCEDURE — 88304 TISSUE EXAM BY PATHOLOGIST: CPT

## 2018-01-03 PROCEDURE — 88302 TISSUE EXAM BY PATHOLOGIST: CPT | Mod: 26

## 2018-01-03 PROCEDURE — 88302 TISSUE EXAM BY PATHOLOGIST: CPT

## 2018-01-03 PROCEDURE — 47562 LAPAROSCOPIC CHOLECYSTECTOMY: CPT

## 2018-01-03 RX ORDER — FAMOTIDINE 10 MG/ML
0 INJECTION INTRAVENOUS
Qty: 0 | Refills: 0 | COMMUNITY

## 2018-01-03 RX ORDER — CELECOXIB 200 MG/1
200 CAPSULE ORAL ONCE
Qty: 0 | Refills: 0 | Status: COMPLETED | OUTPATIENT
Start: 2018-01-03 | End: 2018-01-03

## 2018-01-03 RX ORDER — HYDROMORPHONE HYDROCHLORIDE 2 MG/ML
1 INJECTION INTRAMUSCULAR; INTRAVENOUS; SUBCUTANEOUS
Qty: 0 | Refills: 0 | Status: DISCONTINUED | OUTPATIENT
Start: 2018-01-03 | End: 2018-01-03

## 2018-01-03 RX ADMIN — Medication 1000 MILLIGRAM(S): at 13:02

## 2018-01-03 RX ADMIN — CELECOXIB 200 MILLIGRAM(S): 200 CAPSULE ORAL at 13:02

## 2018-01-03 RX ADMIN — ONDANSETRON 4 MILLIGRAM(S): 8 TABLET, FILM COATED ORAL at 17:06

## 2018-01-03 RX ADMIN — CELECOXIB 200 MILLIGRAM(S): 200 CAPSULE ORAL at 17:04

## 2018-01-03 RX ADMIN — OXYCODONE HYDROCHLORIDE 5 MILLIGRAM(S): 5 TABLET ORAL at 17:05

## 2018-01-03 RX ADMIN — HYDROMORPHONE HYDROCHLORIDE 1 MILLIGRAM(S): 2 INJECTION INTRAMUSCULAR; INTRAVENOUS; SUBCUTANEOUS at 17:46

## 2018-01-03 NOTE — BRIEF OPERATIVE NOTE - PROCEDURE
<<-----Click on this checkbox to enter Procedure Umbilical hernia repair  01/03/2018    Active  ERIC  Laparoscopic cholecystectomy  01/03/2018    Active  ERIC

## 2018-01-03 NOTE — BRIEF OPERATIVE NOTE - POST-OP DX
Calculus of gallbladder without cholecystitis without obstruction  01/03/2018    [facility]  Umbilical hernia without obstruction and without gangrene  01/03/2018    [facility]

## 2018-01-03 NOTE — ASU DISCHARGE PLAN (ADULT/PEDIATRIC). - NOTIFY
Persistent Nausea and Vomiting/Increased Irritability or Sluggishness/Numbness, color, or temperature change to extremity/Fever greater than 101/Unable to Urinate/Pain not relieved by Medications/Excessive Diarrhea/Inability to Tolerate Liquids or Foods/Bleeding that does not stop/Swelling that continues

## 2018-01-03 NOTE — BRIEF OPERATIVE NOTE - OPERATION/FINDINGS
laparoscopic cholecystectomy for cholelithiasis, cystic duct and artery identified, large stone in the gallbladder neck, fat containing umbilical hernia

## 2018-01-03 NOTE — ASU PATIENT PROFILE, ADULT - PMH
Acid reflux    Cholecystitis    IBS (irritable bowel syndrome)    Left inguinal hernia    Migraine    Shingles  07/2017

## 2018-01-03 NOTE — ASU DISCHARGE PLAN (ADULT/PEDIATRIC). - MEDICATION SUMMARY - MEDICATIONS TO TAKE
I will START or STAY ON the medications listed below when I get home from the hospital:    SUMAtriptan 100 mg oral tablet  -- 1 tab(s) by mouth once a day, As Needed  -- Indication: For home medication    Pepcid 20 mg oral tablet  -- 2 doses preop  -- Indication: For home medication

## 2018-01-08 LAB — SURGICAL PATHOLOGY STUDY: SIGNIFICANT CHANGE UP

## 2018-01-11 ENCOUNTER — FORM ENCOUNTER (OUTPATIENT)
Age: 43
End: 2018-01-11

## 2018-01-12 ENCOUNTER — OUTPATIENT (OUTPATIENT)
Dept: OUTPATIENT SERVICES | Facility: HOSPITAL | Age: 43
LOS: 1 days | End: 2018-01-12
Payer: COMMERCIAL

## 2018-01-12 ENCOUNTER — APPOINTMENT (OUTPATIENT)
Dept: ULTRASOUND IMAGING | Facility: CLINIC | Age: 43
End: 2018-01-12
Payer: COMMERCIAL

## 2018-01-12 DIAGNOSIS — Z98.890 OTHER SPECIFIED POSTPROCEDURAL STATES: Chronic | ICD-10-CM

## 2018-01-12 DIAGNOSIS — Z00.8 ENCOUNTER FOR OTHER GENERAL EXAMINATION: ICD-10-CM

## 2018-01-12 DIAGNOSIS — Z86.79 PERSONAL HISTORY OF OTHER DISEASES OF THE CIRCULATORY SYSTEM: Chronic | ICD-10-CM

## 2018-01-12 PROCEDURE — 76700 US EXAM ABDOM COMPLETE: CPT | Mod: 26

## 2018-01-12 PROCEDURE — 76700 US EXAM ABDOM COMPLETE: CPT

## 2018-01-16 ENCOUNTER — APPOINTMENT (OUTPATIENT)
Dept: SURGERY | Facility: CLINIC | Age: 43
End: 2018-01-16
Payer: COMMERCIAL

## 2018-01-16 VITALS
OXYGEN SATURATION: 99 % | RESPIRATION RATE: 16 BRPM | SYSTOLIC BLOOD PRESSURE: 109 MMHG | HEART RATE: 69 BPM | TEMPERATURE: 98.1 F | DIASTOLIC BLOOD PRESSURE: 71 MMHG

## 2018-01-16 DIAGNOSIS — Z87.19 PERSONAL HISTORY OF OTHER DISEASES OF THE DIGESTIVE SYSTEM: ICD-10-CM

## 2018-01-16 DIAGNOSIS — Z09 ENCOUNTER FOR FOLLOW-UP EXAMINATION AFTER COMPLETED TREATMENT FOR CONDITIONS OTHER THAN MALIGNANT NEOPLASM: ICD-10-CM

## 2018-01-16 DIAGNOSIS — G43.909 MIGRAINE, UNSPECIFIED, NOT INTRACTABLE, W/OUT STATUS MIGRAINOSUS: ICD-10-CM

## 2018-01-16 LAB
ALBUMIN SERPL ELPH-MCNC: 4.3 G/DL
ALP BLD-CCNC: 54 U/L
ALT SERPL-CCNC: 27 U/L
AMYLASE/CREAT SERPL: 82 U/L
ANION GAP SERPL CALC-SCNC: 16 MMOL/L
AST SERPL-CCNC: 23 U/L
BASOPHILS # BLD AUTO: 0.04 K/UL
BASOPHILS NFR BLD AUTO: 0.6 %
BILIRUB SERPL-MCNC: 0.6 MG/DL
BUN SERPL-MCNC: 15 MG/DL
CALCIUM SERPL-MCNC: 10 MG/DL
CHLORIDE SERPL-SCNC: 102 MMOL/L
CO2 SERPL-SCNC: 26 MMOL/L
CREAT SERPL-MCNC: 1.08 MG/DL
EOSINOPHIL # BLD AUTO: 0.06 K/UL
EOSINOPHIL NFR BLD AUTO: 1 %
GLUCOSE SERPL-MCNC: 105 MG/DL
HCT VFR BLD CALC: 44.2 %
HGB BLD-MCNC: 13.9 G/DL
IMM GRANULOCYTES NFR BLD AUTO: 0.2 %
LPL SERPL-CCNC: 40 U/L
LYMPHOCYTES # BLD AUTO: 1.41 K/UL
LYMPHOCYTES NFR BLD AUTO: 22.8 %
MAN DIFF?: NORMAL
MCHC RBC-ENTMCNC: 26.5 PG
MCHC RBC-ENTMCNC: 31.4 GM/DL
MCV RBC AUTO: 84.4 FL
MONOCYTES # BLD AUTO: 0.48 K/UL
MONOCYTES NFR BLD AUTO: 7.8 %
NEUTROPHILS # BLD AUTO: 4.18 K/UL
NEUTROPHILS NFR BLD AUTO: 67.6 %
PLATELET # BLD AUTO: 243 K/UL
POTASSIUM SERPL-SCNC: 4.2 MMOL/L
PROT SERPL-MCNC: 7.1 G/DL
RBC # BLD: 5.24 M/UL
RBC # FLD: 14.8 %
SODIUM SERPL-SCNC: 144 MMOL/L
WBC # FLD AUTO: 6.18 K/UL

## 2018-01-16 PROCEDURE — 99024 POSTOP FOLLOW-UP VISIT: CPT

## 2018-01-16 RX ORDER — OXYCODONE 5 MG/1
5 TABLET ORAL
Qty: 15 | Refills: 0 | Status: DISCONTINUED | COMMUNITY
Start: 2018-01-03 | End: 2018-01-16

## 2018-02-02 NOTE — H&P PST ADULT - NS PRO TALK SOMEONE YN
Please use the [facility] lab so your blood tests come to Epic system    Antibiotic before procedures as currently doing    We will change your mail order supplier no

## 2018-09-19 ENCOUNTER — TRANSCRIPTION ENCOUNTER (OUTPATIENT)
Age: 43
End: 2018-09-19

## 2019-09-30 ENCOUNTER — TRANSCRIPTION ENCOUNTER (OUTPATIENT)
Age: 44
End: 2019-09-30

## 2020-06-10 ENCOUNTER — TRANSCRIPTION ENCOUNTER (OUTPATIENT)
Age: 45
End: 2020-06-10

## 2020-06-24 ENCOUNTER — TRANSCRIPTION ENCOUNTER (OUTPATIENT)
Age: 45
End: 2020-06-24

## 2021-08-25 NOTE — ASU PATIENT PROFILE, ADULT - DOES PATIENT HAVE ADVANCE DIRECTIVE
No Post-Care Instructions: Patient instructed to not lie down for 4 hours and limit physical activity for 24 hours. Patient instructed not to travel by airplane for 48 hours. Consent: Written consent obtained. Risks include but not limited to lid/brow ptosis, bruising, swelling, diplopia, temporary effect, incomplete chemical denervation. Measure In Units Or Cc's?: units Detail Level: Detailed Price Per Unit Or Per Cc In $ (Use Numbers Only, No Special Characters Or $): 13.00 Dilution (U/0.1 Cc): 1 Quantity Per Injection Site (Units): 6 Quantity Per Injection Site (Units): 3

## 2022-05-10 NOTE — PROVIDER CONTACT NOTE (OTHER) - SITUATION
Reason for Disposition  • Unexplained nausea    Additional Information  • Negative: Shock suspected (e.g., cold/pale/clammy skin, too weak to stand, low BP, rapid pulse)  • Negative: Sounds like a life-threatening emergency to the triager  • Negative: [1] Nausea or vomiting AND [2] pregnancy < 20 weeks  • Negative: Menstrual Period - Missed or Late (i.e., pregnancy suspected)  • Negative: Heat exhaustion suspected (i.e., dehydration from heat exposure)  • Negative: Motion sickness suspected (i.e., nausea with car, plane, boat, or train travel)  • Negative: Anxiety or stress suspected (i.e., nausea with anxiety attacks or stressful situations)  • Negative: Traumatic Brain Injury (TBI) suspected  • Negative: Nausea (or Vomiting) in a cancer patient who is currently (or recently) receiving chemotherapy or radiation therapy, or cancer patient who has metastatic or end-stage cancer and is receiving palliative care  • Negative: Vomiting occurs  • Negative: Other symptom is present, see that guideline.  (e.g., chest pain, headache, dizziness, abdominal pain, colds, sore throat, etc.).  • Negative: Unable to walk, or can only walk with assistance (e.g., requires support)  • Negative: Difficulty breathing  • Negative: [1] Insulin-dependent diabetes (Type I) AND [2] glucose > 400 mg/dl (22 mmol/l)  • Negative: [1] Drinking very little AND [2] dehydration suspected (e.g., no urine > 12 hours, very dry mouth, very lightheaded)  • Negative: Patient sounds very sick or weak to the triager  • Negative: Fever > 104 F (40 C)  • Negative: [1] Fever > 101 F (38.3 C) AND [2] age > 60 years  • Negative: [1] Fever > 100.0 F (37.8 C) AND [2] bedridden (e.g., nursing home patient, CVA, chronic illness, recovering from surgery)  • Negative: [1] Fever > 100.0 F (37.8 C) AND [2] diabetes mellitus or weak immune system (e.g., HIV positive, cancer chemo, splenectomy, organ transplant, chronic steroids)  • Negative: Taking any of the  "following medications: digoxin (Lanoxin), lithium, theophylline, phenytoin (Dilantin)  • Negative: Yellowish color of the skin or white of the eye (i.e., jaundice)  • Negative: Fever present > 3 days (72 hours)  • Negative: Receiving cancer chemotherapy medication  • Negative: Taking prescription medication that could cause nausea (e.g., narcotics/opiates, antibiotics, OCPs, many others)  • Negative: Nausea lasts > 1 week  • Negative: Substance use (drug use) or unhealthy alcohol use, known or suspected  • Negative: Nausea is a chronic symptom (recurrent or ongoing AND present > 4 weeks)    Answer Assessment - Initial Assessment Questions  1. NAUSEA SEVERITY: \"How bad is the nausea?\" (e.g., mild, moderate, severe; dehydration, weight loss)    - MILD: loss of appetite without change in eating habits    - MODERATE: decreased oral intake without significant weight loss, dehydration, or malnutrition    - SEVERE: inadequate caloric or fluid intake, significant weight loss, symptoms of dehydration      Moderate  2. ONSET: \"When did the nausea begin?\"      This PM  3. VOMITING: \"Any vomiting?\" If Yes, ask: \"How many times today?\"      Yes - unsure  4. RECURRENT SYMPTOM: \"Have you had nausea before?\" If Yes, ask: \"When was the last time?\" \"What happened that time?\"      Denies  5. CAUSE: \"What do you think is causing the nausea?\"      Unknown  6. PREGNANCY: \"Is there any chance you are pregnant?\" (e.g., unprotected intercourse, missed birth control pill, broken condom)      NA    Protocols used: NAUSEA-ADULT-AH      " Pt has shingles, currently still receiving antiviral medication. Shingles is not scabbed over but has raised zosteriform lesions on posterior right lower back and middle of spine. Clarify isolation.

## 2025-02-03 NOTE — H&P PST ADULT - PRIMARY CARE PROVIDER
Addended by: SHAWN PERERA on: 2/3/2025 08:26 AM     Modules accepted: Orders     Dr. SHAHZAD White [phone]

## 2025-04-04 ENCOUNTER — NON-APPOINTMENT (OUTPATIENT)
Age: 50
End: 2025-04-04